# Patient Record
Sex: FEMALE | Race: WHITE | NOT HISPANIC OR LATINO | Employment: FULL TIME | URBAN - NONMETROPOLITAN AREA
[De-identification: names, ages, dates, MRNs, and addresses within clinical notes are randomized per-mention and may not be internally consistent; named-entity substitution may affect disease eponyms.]

---

## 2017-06-12 ENCOUNTER — TRANSCRIBE ORDERS (OUTPATIENT)
Dept: ADMINISTRATIVE | Facility: HOSPITAL | Age: 57
End: 2017-06-12

## 2017-06-12 DIAGNOSIS — Z13.820 SCREENING FOR OSTEOPOROSIS: Primary | ICD-10-CM

## 2017-06-12 DIAGNOSIS — Z12.31 VISIT FOR SCREENING MAMMOGRAM: ICD-10-CM

## 2017-08-01 ENCOUNTER — HOSPITAL ENCOUNTER (OUTPATIENT)
Dept: MAMMOGRAPHY | Facility: HOSPITAL | Age: 57
Discharge: HOME | End: 2017-08-01

## 2017-08-01 ENCOUNTER — HOSPITAL ENCOUNTER (OUTPATIENT)
Dept: BONE DENSITY | Facility: HOSPITAL | Age: 57
End: 2017-08-01

## 2017-08-16 ENCOUNTER — TRANSCRIBE ORDERS (OUTPATIENT)
Dept: ADMINISTRATIVE | Facility: HOSPITAL | Age: 57
End: 2017-08-16

## 2017-08-16 ENCOUNTER — HOSPITAL ENCOUNTER (OUTPATIENT)
Dept: MAMMOGRAPHY | Facility: HOSPITAL | Age: 57
Discharge: HOME | End: 2017-08-16
Admitting: NURSE PRACTITIONER

## 2017-08-16 ENCOUNTER — HOSPITAL ENCOUNTER (OUTPATIENT)
Dept: BONE DENSITY | Facility: HOSPITAL | Age: 57
Discharge: HOME | End: 2017-08-16

## 2017-08-16 DIAGNOSIS — Z13.820 SCREENING FOR OSTEOPOROSIS: ICD-10-CM

## 2017-08-16 DIAGNOSIS — R92.8 ABNORMAL MAMMOGRAM: Primary | ICD-10-CM

## 2017-08-16 DIAGNOSIS — Z12.31 VISIT FOR SCREENING MAMMOGRAM: ICD-10-CM

## 2017-08-16 PROCEDURE — 77080 DXA BONE DENSITY AXIAL: CPT

## 2017-08-16 PROCEDURE — 77067 SCR MAMMO BI INCL CAD: CPT | Performed by: RADIOLOGY

## 2017-08-16 PROCEDURE — 77063 BREAST TOMOSYNTHESIS BI: CPT

## 2017-08-16 PROCEDURE — 77063 BREAST TOMOSYNTHESIS BI: CPT | Performed by: RADIOLOGY

## 2017-08-16 PROCEDURE — G0202 SCR MAMMO BI INCL CAD: HCPCS

## 2017-08-16 PROCEDURE — 77080 DXA BONE DENSITY AXIAL: CPT | Performed by: RADIOLOGY

## 2017-08-23 ENCOUNTER — HOSPITAL ENCOUNTER (OUTPATIENT)
Dept: ULTRASOUND IMAGING | Facility: HOSPITAL | Age: 57
Discharge: HOME | End: 2017-08-23
Admitting: NURSE PRACTITIONER

## 2017-08-23 DIAGNOSIS — R92.8 ABNORMAL MAMMOGRAM: ICD-10-CM

## 2017-08-23 PROCEDURE — 76642 ULTRASOUND BREAST LIMITED: CPT | Performed by: RADIOLOGY

## 2017-08-23 PROCEDURE — 76642 ULTRASOUND BREAST LIMITED: CPT

## 2017-12-11 ENCOUNTER — TRANSCRIBE ORDERS (OUTPATIENT)
Dept: ADMINISTRATIVE | Facility: HOSPITAL | Age: 57
End: 2017-12-11

## 2017-12-11 DIAGNOSIS — R92.8 ABNORMAL MAMMOGRAM: Primary | ICD-10-CM

## 2018-01-04 ENCOUNTER — HOSPITAL ENCOUNTER (OUTPATIENT)
Dept: ULTRASOUND IMAGING | Facility: HOSPITAL | Age: 58
Discharge: HOME | End: 2018-01-04
Admitting: NURSE PRACTITIONER

## 2018-01-04 DIAGNOSIS — R92.8 ABNORMAL MAMMOGRAM: ICD-10-CM

## 2018-01-04 PROCEDURE — 76642 ULTRASOUND BREAST LIMITED: CPT | Performed by: RADIOLOGY

## 2018-01-04 PROCEDURE — 76642 ULTRASOUND BREAST LIMITED: CPT

## 2019-01-22 ENCOUNTER — SPECIALTY PHARMACY (OUTPATIENT)
Dept: PHARMACY | Facility: HOSPITAL | Age: 59
End: 2019-01-22

## 2019-01-22 RX ORDER — NALOXONE HYDROCHLORIDE 4 MG/.1ML
1 SPRAY NASAL AS NEEDED
Qty: 2 EACH | Refills: 0 | Status: SHIPPED | OUTPATIENT
Start: 2019-01-22

## 2019-01-22 NOTE — PROGRESS NOTES
The patient presented to our facility today requesting naloxone.  The patient received verbal and written education on the following:   - Risk factors of opioid overdose  - Strategies to prevent opioid overdose  - Signs of opioid overdose  - Steps in responding to an overdose   - Information about naloxone  - Procedures for administering naloxone  - Proper storage and expiration of the naloxone product dispensed      Pursuant to the [place] [employer] administrative regulation 201 JOSIAS 2:360, we will dispense:      Narcan® (naloxone HCl) 4mg/0.1mL nasal spray   Dispense #1 box (2 doses)    Sig: Call 911   Do not prime.  Spray into nostril upon signs of opioid overdose.     May repeat in 2-3 minutes in the opposite nostril if no or minimal breathing and  responsiveness, then as needed (if doses are available) every 2-3 minutes.      The signed protocol is kept in the [facility] [address]     The patient was given the opportunity to ask questions.  All questions were addressed.  The patient expressed understanding of the education provided.      Abigail Cruz [facility]  01/22/19  3:58 PM

## 2019-12-13 ENCOUNTER — HOSPITAL ENCOUNTER (OUTPATIENT)
Dept: MAMMOGRAPHY | Facility: HOSPITAL | Age: 59
Discharge: HOME | End: 2019-12-13

## 2019-12-13 ENCOUNTER — HOSPITAL ENCOUNTER (OUTPATIENT)
Dept: BONE DENSITY | Facility: HOSPITAL | Age: 59
Discharge: HOME | End: 2019-12-13
Admitting: NURSE PRACTITIONER

## 2019-12-13 DIAGNOSIS — M81.0 AGE-RELATED OSTEOPOROSIS WITHOUT CURRENT PATHOLOGICAL FRACTURE: ICD-10-CM

## 2019-12-13 DIAGNOSIS — Z12.31 VISIT FOR SCREENING MAMMOGRAM: ICD-10-CM

## 2019-12-13 PROCEDURE — 77067 SCR MAMMO BI INCL CAD: CPT

## 2019-12-13 PROCEDURE — 77063 BREAST TOMOSYNTHESIS BI: CPT | Performed by: RADIOLOGY

## 2019-12-13 PROCEDURE — 77067 SCR MAMMO BI INCL CAD: CPT | Performed by: RADIOLOGY

## 2019-12-13 PROCEDURE — 77063 BREAST TOMOSYNTHESIS BI: CPT

## 2021-02-25 DIAGNOSIS — Z23 IMMUNIZATION DUE: ICD-10-CM

## 2021-12-27 ENCOUNTER — APPOINTMENT (OUTPATIENT)
Dept: MAMMOGRAPHY | Facility: HOSPITAL | Age: 61
End: 2021-12-27

## 2022-03-21 ENCOUNTER — HOSPITAL ENCOUNTER (OUTPATIENT)
Dept: MAMMOGRAPHY | Facility: HOSPITAL | Age: 62
Discharge: HOME | End: 2022-03-21
Admitting: NURSE PRACTITIONER

## 2022-03-21 DIAGNOSIS — Z12.31 VISIT FOR SCREENING MAMMOGRAM: ICD-10-CM

## 2022-03-21 PROCEDURE — 77063 BREAST TOMOSYNTHESIS BI: CPT

## 2022-03-21 PROCEDURE — 77067 SCR MAMMO BI INCL CAD: CPT | Performed by: RADIOLOGY

## 2022-03-21 PROCEDURE — 77063 BREAST TOMOSYNTHESIS BI: CPT | Performed by: RADIOLOGY

## 2022-03-21 PROCEDURE — 77067 SCR MAMMO BI INCL CAD: CPT

## 2022-04-12 ENCOUNTER — APPOINTMENT (OUTPATIENT)
Dept: ULTRASOUND IMAGING | Facility: HOSPITAL | Age: 62
End: 2022-04-12

## 2022-04-12 ENCOUNTER — APPOINTMENT (OUTPATIENT)
Dept: MAMMOGRAPHY | Facility: HOSPITAL | Age: 62
End: 2022-04-12

## 2022-05-05 ENCOUNTER — HOSPITAL ENCOUNTER (OUTPATIENT)
Dept: MAMMOGRAPHY | Facility: HOSPITAL | Age: 62
Discharge: HOME | End: 2022-05-05

## 2022-05-05 ENCOUNTER — HOSPITAL ENCOUNTER (OUTPATIENT)
Dept: ULTRASOUND IMAGING | Facility: HOSPITAL | Age: 62
Discharge: HOME | End: 2022-05-05

## 2022-05-05 DIAGNOSIS — R92.8 ABNORMAL MAMMOGRAM: ICD-10-CM

## 2022-05-05 PROCEDURE — G0279 TOMOSYNTHESIS, MAMMO: HCPCS

## 2022-05-05 PROCEDURE — 76642 ULTRASOUND BREAST LIMITED: CPT | Performed by: RADIOLOGY

## 2022-05-05 PROCEDURE — 77061 BREAST TOMOSYNTHESIS UNI: CPT | Performed by: RADIOLOGY

## 2022-05-05 PROCEDURE — 77065 DX MAMMO INCL CAD UNI: CPT

## 2022-05-05 PROCEDURE — 76642 ULTRASOUND BREAST LIMITED: CPT

## 2022-05-05 PROCEDURE — 77065 DX MAMMO INCL CAD UNI: CPT | Performed by: RADIOLOGY

## 2022-06-02 ENCOUNTER — HOSPITAL ENCOUNTER (OUTPATIENT)
Dept: MAMMOGRAPHY | Facility: HOSPITAL | Age: 62
Discharge: HOME | End: 2022-06-02

## 2022-06-02 DIAGNOSIS — R92.8 ABNORMAL MAMMOGRAM OF LEFT BREAST: ICD-10-CM

## 2022-06-02 PROCEDURE — A4648 IMPLANTABLE TISSUE MARKER: HCPCS

## 2022-06-02 PROCEDURE — 77065 DX MAMMO INCL CAD UNI: CPT | Performed by: RADIOLOGY

## 2022-06-02 PROCEDURE — 19081 BX BREAST 1ST LESION STRTCTC: CPT | Performed by: RADIOLOGY

## 2022-06-06 LAB — REF LAB TEST METHOD: NORMAL

## 2022-06-08 ENCOUNTER — TELEPHONE (OUTPATIENT)
Dept: MAMMOGRAPHY | Facility: HOSPITAL | Age: 62
End: 2022-06-08

## 2022-06-08 NOTE — TELEPHONE ENCOUNTER
Patient notified of breast biopsy results and recommendations. Encouraged patient to call with further needs. Patient will call back after discussing results with family to set up a surgical consultation, NN notified.

## 2022-06-24 ENCOUNTER — NURSE NAVIGATOR (OUTPATIENT)
Dept: ONCOLOGY | Facility: CLINIC | Age: 62
End: 2022-06-24

## 2022-06-24 ENCOUNTER — OFFICE VISIT (OUTPATIENT)
Dept: SURGERY | Facility: CLINIC | Age: 62
End: 2022-06-24

## 2022-06-24 VITALS
BODY MASS INDEX: 38.27 KG/M2 | SYSTOLIC BLOOD PRESSURE: 164 MMHG | WEIGHT: 216 LBS | HEIGHT: 63 IN | HEART RATE: 88 BPM | DIASTOLIC BLOOD PRESSURE: 88 MMHG

## 2022-06-24 DIAGNOSIS — Z17.0 MALIGNANT NEOPLASM OF UPPER-OUTER QUADRANT OF LEFT BREAST IN FEMALE, ESTROGEN RECEPTOR POSITIVE: Primary | ICD-10-CM

## 2022-06-24 DIAGNOSIS — C50.412 MALIGNANT NEOPLASM OF UPPER-OUTER QUADRANT OF LEFT BREAST IN FEMALE, ESTROGEN RECEPTOR POSITIVE: Primary | ICD-10-CM

## 2022-06-24 PROCEDURE — 93702 BIS XTRACELL FLUID ANALYSIS: CPT | Performed by: SURGERY

## 2022-06-24 PROCEDURE — 76642 ULTRASOUND BREAST LIMITED: CPT | Performed by: SURGERY

## 2022-06-24 PROCEDURE — 99204 OFFICE O/P NEW MOD 45 MIN: CPT | Performed by: SURGERY

## 2022-06-24 RX ORDER — ERGOCALCIFEROL (VITAMIN D2) 10 MCG
400 TABLET ORAL DAILY
COMMUNITY

## 2022-06-24 RX ORDER — ROSUVASTATIN CALCIUM 10 MG/1
TABLET, COATED ORAL
COMMUNITY
Start: 2022-04-19

## 2022-06-24 RX ORDER — PROPRANOLOL HYDROCHLORIDE 40 MG/1
TABLET ORAL
COMMUNITY
Start: 2022-04-19

## 2022-06-24 RX ORDER — PANTOPRAZOLE SODIUM 40 MG/1
TABLET, DELAYED RELEASE ORAL
COMMUNITY
Start: 2022-05-19

## 2022-06-24 NOTE — PROGRESS NOTES
Subjective   Terri Foster is a 62 y.o. female here today for pathology review referred by Dr. Tierney.  History of Present Illness   Ms. Camacho was seen in the office today for her new diagnosis of left breast cancer.  She underwent a screening mammogram and subsequent imaging evaluation and biopsy.  Patient denies palpable mass or nipple discharge.  There is no prior history of breast biopsy or cyst aspiration.  There is no family history of breast or ovarian cancer.  The patient is postmenopausal and is not on hormone replacement therapy.  Imaging/pathology:  3/21/2022-screening mammogram demonstrating left breast asymmetric density  5/5/2022-diagnostic left breast mammogram and left breast ultrasound demonstrating distortion and hypoechoic mass.  6/2/2022-left breast stereotactic biopsy and mammogram demonstrating marking clip to be at the biopsy site and pathology demonstrating grade 1 infiltrating ductal carcinoma, ER positive, OR positive, HER2 negative  No Known Allergies  Current Outpatient Medications   Medication Sig Dispense Refill   • metFORMIN (GLUCOPHAGE) 500 MG tablet      • naloxone (NARCAN) 4 MG/0.1ML nasal spray Use 1 spray into the nostril(s) as directed by provider As Needed (opioid overdose). Call 911. Don't prime. Repeat in 2-3 min if necessary. 2 each 0   • pantoprazole (PROTONIX) 40 MG EC tablet TAKE 1 TABLET BY MOUTH TWICE DAILY FOR STOMACH     • propranolol (INDERAL) 40 MG tablet      • rosuvastatin (CRESTOR) 10 MG tablet      • Vitamin D, Cholecalciferol, (CHOLECALCIFEROL) 10 MCG (400 UNIT) tablet Take 400 Units by mouth Daily.       No current facility-administered medications for this visit.     Past Medical History:   Diagnosis Date   • Diabetes mellitus (HCC)    • Infectious viral hepatitis    • Migraines      Past Surgical History:   Procedure Laterality Date   • GALLBLADDER SURGERY     • TONSILLECTOMY         Pertinent Review of Systems:  Respiratory: no shortness of  "breath  Cardiovascular: no chest pain  Other pertinent:      Objective   /88 (BP Location: Left arm)   Pulse 88   Ht 160 cm (63\")   Wt 98 kg (216 lb)   BMI 38.26 kg/m²   Physical Exam  Well-developed well-nourished female no acute distress   neck:  No supraclavicular adenopathy.  Trachea midline  Lungs:  Respiratory effort normal. Auscultation: Clear, without wheezes, rhonchi, rales  Heart:  Regular rate and rhythm, without murmur, gallop, rub.  No pedal edema  Breasts: On visual inspection the breasts are symmetrical.  Examination of the right breast demonstrates no discrete mass, skin change, or axillary adenopathy.  Examination of the left breast demonstrates no discrete mass, skin change, or axillary adenopathy.       Procedures   Baseline L dex was performed and was 0.4    Limited left breast Ultrasound    Indication: Evaluation for clip placement    Description: With use of the 12.5 MHz transducer the area of clinical concern was scanned in multiple planes.    Findings: The upper outer quadrant of the left breast was scanned in multiple planes.  Directly posterior and slightly medial to the skin incision site the biopsy cavity and marking clip is identified.    Impression: Biopsy clip identified left breast 2:00    Recommendation: Procedure amenable to ultrasound localization    Results/Data:  Imaging: Mammogram and ultrasound reports and images from 6/2/2022, 5/5/2022 and 3/21/2022 were reviewed.  I agree with the assessment  Notes:   Lab:   Other: Pathology report was reviewed.  I agree with the assessment    Assessment & Plan   Clinical stage I carcinoma of the left breast, ER positive, ND positive, HER2 negative.    Plan: Proceed with breast MRI.  If no other findings identifiable patient will be an excellent candidate for breast conservation         Discussion/Summary: Treatment options discussed with the patient and son at length including lumpectomy plus radiation versus mastectomy.  Role of " sentinel node biopsy was discussed.  The role of chemotherapy was discussed.  The issue of margin status and possible need for additional procedures was discussed.    Time spent:     Class 2 Severe Obesity (BMI >=35 and <=39.9). Obesity-related health conditions include the following: hypertension. Obesity is newly identified. BMI is is above average; BMI management plan is completed. We discussed portion control and increasing exercise.       No future appointments.      Please note that portions of this note were completed with a voice recognition program.

## 2022-06-25 PROBLEM — Z17.0 MALIGNANT NEOPLASM OF UPPER-OUTER QUADRANT OF LEFT BREAST IN FEMALE, ESTROGEN RECEPTOR POSITIVE: Status: ACTIVE | Noted: 2022-06-25

## 2022-06-25 PROBLEM — C50.412 MALIGNANT NEOPLASM OF UPPER-OUTER QUADRANT OF LEFT BREAST IN FEMALE, ESTROGEN RECEPTOR POSITIVE: Status: ACTIVE | Noted: 2022-06-25

## 2022-06-28 ENCOUNTER — NURSE NAVIGATOR (OUTPATIENT)
Dept: ONCOLOGY | Facility: CLINIC | Age: 62
End: 2022-06-28

## 2022-06-28 NOTE — PROGRESS NOTES
Met with patient and son on this date. Patient seen in a new consult with Dr. Rhoades. The role of the Nurse Navigator introduced to patient/son. Time spent talking to patient, empathetic listening provided and reassurance given. New patient folder given and reviewed with patient. NN contact information provided and encouraged patient to call with any questions or concerns. Patient verbalized understanding and is in agreement with plan of care. NN will follow and assist PRN.

## 2022-06-28 NOTE — PROGRESS NOTES
Called patient on this date to follow up after surgery consult. NN addressed illness, understanding, and provided clarification as needed.  Time spent talking with patient, empathetic listening provided, and reassurance given. NN contact information was provided and encouraged patient to call with any questions or concerns. Pt. Verbalized understanding and is in agreement with plan of care. NN will follow and assist PRN.

## 2022-06-29 ENCOUNTER — TELEPHONE (OUTPATIENT)
Dept: ONCOLOGY | Facility: CLINIC | Age: 62
End: 2022-06-29

## 2022-06-29 NOTE — TELEPHONE ENCOUNTER
Called patient gave her new appointment day and time. Patient agreed to new date and time, she RBV.

## 2022-07-06 ENCOUNTER — HOSPITAL ENCOUNTER (OUTPATIENT)
Dept: MRI IMAGING | Facility: HOSPITAL | Age: 62
Discharge: HOME | End: 2022-07-06
Admitting: SURGERY

## 2022-07-06 DIAGNOSIS — Z17.0 MALIGNANT NEOPLASM OF UPPER-OUTER QUADRANT OF LEFT BREAST IN FEMALE, ESTROGEN RECEPTOR POSITIVE: ICD-10-CM

## 2022-07-06 DIAGNOSIS — C50.412 MALIGNANT NEOPLASM OF UPPER-OUTER QUADRANT OF LEFT BREAST IN FEMALE, ESTROGEN RECEPTOR POSITIVE: ICD-10-CM

## 2022-07-06 LAB — CREAT BLDA-MCNC: 0.7 MG/DL (ref 0.6–1.3)

## 2022-07-06 PROCEDURE — 0 GADOBENATE DIMEGLUMINE 529 MG/ML SOLUTION: Performed by: SURGERY

## 2022-07-06 PROCEDURE — 77049 MRI BREAST C-+ W/CAD BI: CPT | Performed by: RADIOLOGY

## 2022-07-06 PROCEDURE — A9577 INJ MULTIHANCE: HCPCS | Performed by: SURGERY

## 2022-07-06 PROCEDURE — 77049 MRI BREAST C-+ W/CAD BI: CPT

## 2022-07-06 PROCEDURE — 82565 ASSAY OF CREATININE: CPT

## 2022-07-06 RX ADMIN — GADOBENATE DIMEGLUMINE 20 ML: 529 INJECTION, SOLUTION INTRAVENOUS at 01:45

## 2022-07-08 ENCOUNTER — TELEPHONE (OUTPATIENT)
Dept: SURGERY | Facility: CLINIC | Age: 62
End: 2022-07-08

## 2022-07-08 NOTE — TELEPHONE ENCOUNTER
Informed patient that MRI did not show anything new. Ask if she wanted to come in and talk Dr. Rhoades or go ahead and pick a surgery date. She said her son who lives in [place] has ask her to get a second opinion which she is going to do. I suggested that when he comes into town to come meet Dr. Rhoades and share his concerns. She said she'd very much like to do that and will call when he's here. I told her I would place her in my result book for the first week in August just to make sure she is getting treatment because that is the most important thing.

## 2022-07-13 ENCOUNTER — NURSE NAVIGATOR (OUTPATIENT)
Dept: ONCOLOGY | Facility: CLINIC | Age: 62
End: 2022-07-13

## 2022-07-13 NOTE — PROGRESS NOTES
Called patient to follow up if patient needed help setting up for a second opinion at her request. Also, to find out if she had an idea of when her son was coming into states from [place], to see if she wanted to set up a time and date to talk to Dr. Rhoades again. Patient didn't answer, but left a message and contact information.

## 2022-07-15 ENCOUNTER — HOSPITAL ENCOUNTER (OUTPATIENT)
Dept: MRI IMAGING | Facility: HOSPITAL | Age: 62
End: 2022-07-15

## 2022-07-18 ENCOUNTER — NURSE NAVIGATOR (OUTPATIENT)
Dept: ONCOLOGY | Facility: CLINIC | Age: 62
End: 2022-07-18

## 2022-07-18 NOTE — PROGRESS NOTES
Called patient on this date to talk about her plans for treatment. Patient is going for a second opinion at  on 7/22/22. Patient stated she would lets us know as soon as a possible where she wanted to have surgery and treatment at. NN gave contact information and encouraged her to call with any questions or concerns.

## 2022-08-02 ENCOUNTER — NURSE NAVIGATOR (OUTPATIENT)
Dept: ONCOLOGY | Facility: CLINIC | Age: 62
End: 2022-08-02

## 2022-08-02 NOTE — PROGRESS NOTES
Called patient on this date to follow up after her 2nd opinion at [facility] in [place]. Patient stated the surgeon in [place] reviewed the MRI taken at [facility] and seen some concerning areas and performed another biopsy on several spots, per patient. Patient is awaiting results now and will keep in touch with us in regards to where she will be having surgery/treatment. NN encouraged her to reach out with any questions or concerns.

## 2023-02-22 ENCOUNTER — APPOINTMENT (OUTPATIENT)
Dept: RADIATION ONCOLOGY | Facility: HOSPITAL | Age: 63
End: 2023-02-22
Payer: COMMERCIAL

## 2023-02-22 ENCOUNTER — CONSULT (OUTPATIENT)
Dept: RADIATION ONCOLOGY | Facility: HOSPITAL | Age: 63
End: 2023-02-22
Payer: COMMERCIAL

## 2023-02-22 VITALS
DIASTOLIC BLOOD PRESSURE: 75 MMHG | SYSTOLIC BLOOD PRESSURE: 135 MMHG | HEART RATE: 81 BPM | RESPIRATION RATE: 18 BRPM | TEMPERATURE: 96.6 F | OXYGEN SATURATION: 97 % | BODY MASS INDEX: 34.9 KG/M2 | WEIGHT: 197 LBS

## 2023-02-22 DIAGNOSIS — Z17.0 MALIGNANT NEOPLASM OF UPPER-OUTER QUADRANT OF LEFT BREAST IN FEMALE, ESTROGEN RECEPTOR POSITIVE: Primary | ICD-10-CM

## 2023-02-22 DIAGNOSIS — C50.412 MALIGNANT NEOPLASM OF UPPER-OUTER QUADRANT OF LEFT BREAST IN FEMALE, ESTROGEN RECEPTOR POSITIVE: Primary | ICD-10-CM

## 2023-02-22 PROCEDURE — G0463 HOSPITAL OUTPT CLINIC VISIT: HCPCS | Performed by: RADIOLOGY

## 2023-02-22 PROCEDURE — 99205 OFFICE O/P NEW HI 60 MIN: CPT | Performed by: RADIOLOGY

## 2023-02-22 RX ORDER — FLUTICASONE PROPIONATE 50 MCG
SPRAY, SUSPENSION (ML) NASAL
COMMUNITY
Start: 2022-11-17

## 2023-02-22 RX ORDER — DIPHENOXYLATE HYDROCHLORIDE AND ATROPINE SULFATE 2.5; .025 MG/1; MG/1
TABLET ORAL DAILY
COMMUNITY

## 2023-02-22 RX ORDER — OFLOXACIN 3 MG/ML
SOLUTION AURICULAR (OTIC)
COMMUNITY
Start: 2023-02-06

## 2023-02-22 RX ORDER — ANASTROZOLE 1 MG/1
1 TABLET ORAL DAILY
COMMUNITY
Start: 2023-02-17 | End: 2024-02-17

## 2023-02-22 RX ORDER — VITAMIN E 268 MG
CAPSULE ORAL DAILY
COMMUNITY

## 2023-02-22 RX ORDER — ONDANSETRON HYDROCHLORIDE 8 MG/1
TABLET, FILM COATED ORAL
COMMUNITY
Start: 2022-11-18

## 2023-02-22 RX ORDER — AMOXICILLIN 875 MG/1
TABLET, COATED ORAL
COMMUNITY
Start: 2023-02-06

## 2023-02-22 NOTE — PROGRESS NOTES
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  Review post-surgical  radiotherapy treatment options.    ICD-10: C50.912                                              History of Present Illness:   Mrs. Terri Foster is a 63-year-old white female who was noted to have a focal asymmetry in the upper outer quadrant of the left breast on screening mammograms obtained on 03/21/2022.  Diagnostic left mammogram and ultrasound (05/05/2022) confirmed the presence of a 0.8 cm irregular hypoechoic nodule at the 2 o'clock position of the left breast.    The patient denied self detection of a breast mass; breast pain; nipple discharge, bleeding or inversion; breast skin color changes; and recent breast trauma.  The patient states that she underwent a negative right breast biopsy several years ago.    MRI scans of the breasts (07/06/2022) showed faint enhancement at the 2 o'clock position of the left breast.  Ultrasound guided biopsy of the left subareolar tissues (07/26/2022) revealed fibroadenoma.    Mrs. Foster underwent a stereotactic left breast biopsy on 12/16/2022.  The biopsy revealed well differentiated ductal carcinoma, [place] grade 1/3.  The specimen was positive for estrogen and progesterone receptors.  HER2 was not overexpressed.    The patient was enrolled in the CHRISTAL-54 trial which is examining the efficacy of neoadjuvant endocrine therapy in ER positive, HER2 negative early stage breast cancer.  The patient received anastrozole.    Mrs. Foster underwent a left lumpectomy procedure with sentinel lymph node biopsies at the [facility] on 01/19/2023.  Examination of resected tissue revealed a 2.5 cm ductal carcinoma and low-grade ductal carcinoma in situ.  The invasive  tumor approached to within 2 mm of a margin of resection.  The DCIS margin was 1 mm.  Micrometastases measuring 1.58 mm was noted in 1 of 2 sentinel lymph nodes.  There was no evidence of extracapsular tumor extension.    The patient's case was discussed at a multidisciplinary tumor board.  Postoperative breast radiotherapy was recommended.  The patient has opted to receive said therapy at [place] due to our closer location to the patient's home at [place].     The patient is undergoing Oncotype DX testing.  It was requested that radiotherapy not commence until the Oncotype DX results are available.    This is a new problem to me, and one that is potentially life-threatening.  (Old medical records were requested, reviewed, and summarized in the HPI)    Obstetric-Gynecologic History:  Menarche at age 14 years, menopause at age 50 years.  A1 (second trimester miscarriage) C0.  First pregnancy at age 24 years.  Patient used birth control pills for a short period in the remote past.  Contraception was barrier type (condom).  Patient denies use of adjuvant hormonal therapy.    Review of Systems   Constitutional: Noncontributory.  Patient states that her appetite and energy levels are good.  She denies symptomatology from her recent left breast surgery.  HEENT: Patient has diminished vision bilaterally, uses glasses.  Patient reports right-sided otalgia.  She is on antibiotic therapy.  Cardiovascular: Noncontributory  Pulmonary: Noncontributory  Gastrointestinal: Patient refers GERD symptoms  Genitourinary: Noncontributory  Hematopoietic/lymphatic: Noncontributory  Integumentary: Noncontributory  Musculoskeletal: Patient reports occasional discomfort in minor joints of the left hand  Neurologic: Noncontributory  Psychiatric: Noncontributory      Vitals:    23 1002   BP: 135/75   Pulse: 81   Resp: 18   Temp: 96.6 °F (35.9 °C)   SpO2: 97%     Past Medical History:   Diagnosis Date   • Diabetes mellitus (HCC)     • Infectious viral hepatitis    • Migraines         Past Surgical History:   Procedure Laterality Date   • BREAST LUMPECTOMY Right 01/2023    @ [facility]   • GALLBLADDER SURGERY     • TONSILLECTOMY        Family History   Problem Relation Age of Onset   • Diabetes Mother    • Hyperlipidemia Mother    • Diabetes Father    • Heart disease Father    • Hyperlipidemia Father    • Cancer Brother         skin   • Diabetes Brother    • Hyperlipidemia Brother    • Breast cancer Neg Hx         Social History     Socioeconomic History   • Marital status: Legally    Tobacco Use   • Smoking status: Never   • Smokeless tobacco: Never   Vaping Use   • Vaping Use: Never used   Substance and Sexual Activity   • Alcohol use: Never   • Drug use: Never   • Sexual activity: Defer      The patient is a nondrinker.  She denies use of all tobacco products.    Allergies:  Patient has no known allergies.   Prior to Admission medications    Medication Sig Start Date End Date Taking? Authorizing Provider   amoxicillin (AMOXIL) 875 MG tablet  2/6/23  Yes Cathy House MD   anastrozole (ARIMIDEX) 1 MG tablet Take 1 mg by mouth Daily. 2/17/23 2/17/24 Yes Cathy House MD   fluticasone (FLONASE) 50 MCG/ACT nasal spray SHAKE LIQUID AND USE 1 SPRAY IN EACH NOSTRIL EVERY 12 HOURS FOR NASAL CONGESTION 11/17/22  Yes Cathy House MD   metFORMIN (GLUCOPHAGE) 500 MG tablet 500 mg 2 (Two) Times a Day With Meals. 5/19/22  Yes Cathy House MD   multivitamin (THERAGRAN) tablet tablet Take  by mouth Daily.   Yes Cathy House MD   naloxone (NARCAN) 4 MG/0.1ML nasal spray Use 1 spray into the nostril(s) as directed by provider As Needed (opioid overdose). Call 911. Don't prime. Repeat in 2-3 min if necessary. 1/22/19  Yes Shanelle Marie MD   ofloxacin (FLOXIN) 0.3 % otic solution INSTILL 10 DROPS TO AFFECTED EAR EVERY DAY FOR 5 DAYS 2/6/23  Yes Cathy House MD   ondansetron (ZOFRAN) 8 MG tablet   22  Yes Cathy House MD   pantoprazole (PROTONIX) 40 MG EC tablet TAKE 1 TABLET BY MOUTH TWICE DAILY FOR STOMACH 22  Yes Cathy House MD   propranolol (INDERAL) 40 MG tablet  22  Yes Cathy House MD   rosuvastatin (CRESTOR) 10 MG tablet  22  Yes Cathy House MD   Vitamin D, Cholecalciferol, (CHOLECALCIFEROL) 10 MCG (400 UNIT) tablet Take 400 Units by mouth Daily.   Yes Cathy House MD   vitamin E 400 UNIT capsule Take  by mouth Daily.   Yes Cathy House MD      Pain:(on a scale of 0-10)    Pain Score    23 [facility][note 3]   PainSc: 0-No pain        Terri Foster reports a pain score of 0.  Given her pain assessment as noted, treatment options were discussed and the following options were decided upon as a follow-up plan to address the patient's pain: patient declined analgesics.       Quality of Life:   KPS: 100  ECO    Advanced Care Plan: Not discussed during this visit   Advance Care Planning   ACP discussion was declined by the patient. Patient does not have an advance directive, declines further assistance.     PHQ-9 Depression Screening:   Little interest or pleasure in doing things?     Feeling down, depressed, or hopeless?     Trouble falling or staying asleep, or sleeping too much?     Feeling tired or having little energy?     Poor appetite or overeating?     Feeling bad about yourself - or that you are a failure or have let yourself or your family down?     Trouble concentrating on things, such as reading the newspaper or watching television?     Moving or speaking so slowly that other people could have noticed? Or the opposite - being so fidgety or restless that you have been moving around a lot more than usual?     Thoughts that you would be better off dead, or of hurting yourself in some way?     PHQ-9 Total Score     If you checked off any problems, how difficult have these problems made it for you to do your work, take care of  things at home, or get along with other people?      PHQ-9 Total Score: 0       Patient screened negative for depression based on a PHQ-9 score of 0 on 2/22/2023.  No further action is indicated.     Physical Examination:  Vitals: See above  Weight: Weight: 89.4 kg (197 lb)   Body mass index is 34.9 kg/m².    Physical Exam  Constitutional: The patient is a well-developed, well-nourished middle-aged white female in no acute distress.  Alert and oriented ×3.  HEENT: Atraumatic. Normocephalic. No abnormalities noted.  Natural upper and lower dentition.  No intraoral lesions present.  Neck: Short, supple, trachea at midline.  No thyromegaly.  No detectable submental, submandibular, cervical or periclavicular adenopathy.  No JVD or carotid bruits.  Lymphatics: No cervical, supraclavicular, or axillary, or inguinal lymphadenopathy is palpated.  CV: Regular rate and rhythm.  No murmurs, rubs, or gallops are appreciated.  Respiratory: Lungs clear to auscultation.  Breath sounds equal bilaterally.  Breasts: The breasts appear symmetric.  No masses are detected.  Patient has a well-healed surgical scar noted at the lateral aspect of the left breast.  No seroma palpated  GI: Abdomen soft, nontender, nondistended, with no hepatosplenomegaly or masses palpated.  Surgical scars from laparoscopic cholecystectomy noted.  Rectal: Not performed  Pelvic: Deferred  Neurologic: Cranial nerves II through XII are grossly intact, with no focal neurological deficits noted on exam.  Normal gait  Psychiatric: Alert and oriented x3. Normal affect, with no anxiety or depression noted.  Normal mood and affect    Radiographs : NM Tumor Localization Whole Body Planar Single Day    Result Date: 1/19/2023  Preoperative intradermal injections for sentinel lymph node surgery. CRITICAL RESULT: No. COMMUNICATION: Per this written report. Approved by Dereje Yung DO on 1/19/2023 9:53 AM By electronically signing this report, I, the attending physician,  attest that I have personally reviewed the images/data for the above examination(s) and agree with the final edited report. Dictated by Dereje Yung DO on 1/19/2023 9:53 AM Signed by Wayne Haque MD on 1/19/2023 10:11 AM    CT Abdomen Pelvis With Contrast    Result Date: 1/9/2023  No specific imaging findings for abdominal or pelvic metastasis. Hepatic steatosis. Mildly enlarged periportal lymph nodes are nonspecific but may reflect sequelae of hepatocellular parenchymal disease. CRITICAL RESULT:   No. COMMUNICATION: Per this written report. Dictated by Brigido Guzman MD on 1/9/2023 8:04 AM Signed by Brigido Guzman MD on 1/9/2023 9:00 AM    DEXA Bone Density Axial    Result Date: 11/18/2022  Normal bone mass. RECOMMENDATIONS: Timing of follow-up DXA should be determined based on clinical assessment, and may be appropriate in two years to evaluate change in BMD. A follow-up DXA should be performed on the same DXA scanner to allow for direct comparison and calculation of change in BMD. CRITICAL RESULT: No. COMMUNICATION: Per this written report. Approved by Dereje Yung DO on 11/18/2022 8:48 AM By electronically signing this report, I, the attending physician, attest that I have personally reviewed the images/data for the above examination(s) and agree with the final edited report. Dictated by Dereje Yung DO on 11/18/2022 8:48 AM Signed by Jeffery Carlos MD on 11/18/2022 9:59 AM    Mammography Breast Surgical Specimen    Result Date: 1/19/2023  Technically successful mammographically guided localization of the two abnormalities in the upper out quadrant of the left breast. CRITICAL RESULT:   No. COMMUNICATION: Findings communicated via telephone to Dr. Nayak on 1/19/2023 at the time of the specimen review. Dictated by Arin Begum MD on 1/19/2023 11:15 AM Signed by Arin Begum MD on 1/19/2023 11:26 AM    Mammography Guided Localization Breast Left    Result Date: 1/19/2023  Technically successful  mammographically guided localization of the two abnormalities in the upper out quadrant of the left breast. CRITICAL RESULT:   No. COMMUNICATION: Findings communicated via telephone to Dr. Nayak on 1/19/2023 at the time of the specimen review. Dictated by Arin Begum MD on 1/19/2023 11:15 AM Signed by Arin Begum MD on 1/19/2023 11:26 AM    Mammography Stereotactic Breast Biopsy Left    Result Date: 12/13/2022  Successful stereotactic biopsy of the calcifications in the left breast at 3:00,  10 cm from the nipple. Post-procedure mammogram of the left breast shows the Q shaped tissue marker is in the appropriate location. Small post-procedural hematoma is present. Histologic findings will be correlated. An addendum will be issued when pathology results become available. PATHOLOGY ASSESSMENT: Waiting for pathology. PATHOLOGY RECOMMENDATION: Waiting for pathology. CRITICAL RESULT:   No. COMMUNICATION: Per this written report. By electronically signing this report, I, the attending physician, attest that I was present for the entire procedure(s) and agree with the final edited report. Dictated by Kaylene Crabtree DO on 12/13/2022 11:38 AM Signed by Ron Ann MD on 12/13/2022 1:38 PM    Mammography Breast Post Biopsy Clip Left    Result Date: 12/13/2022  Successful stereotactic biopsy of the calcifications in the left breast at 3:00,  10 cm from the nipple. Post-procedure mammogram of the left breast shows the Q shaped tissue marker is in the appropriate location. Small post-procedural hematoma is present. Histologic findings will be correlated. An addendum will be issued when pathology results become available. PATHOLOGY ASSESSMENT: Waiting for pathology. PATHOLOGY RECOMMENDATION: Waiting for pathology. CRITICAL RESULT:   No. COMMUNICATION: Per this written report. By electronically signing this report, I, the attending physician, attest that I was present for the entire procedure(s) and agree with the final edited  report. Dictated by Kaylene Crabtree DO on 12/13/2022 11:38 AM Signed by Ron Ann MD on 12/13/2022 1:38 PM    Mammography Breast Diagnostic Tomosynthesis Bilateral    Result Date: 11/18/2022  Right Breast BI-RADS Code:  BI-RADS 1, Negative. Left Breast BI-RADS Code: Finding 1: BI-RADS 2, benign finding Finding 2: BI-RADS 6, biopsy proven malignancy Finding 3: BI-RADS 4, Suspicious finding. Finding 4: BI-RADS 3, probably benign RECOMMENDATIONS: Left Breast Recommendations: Finding 2: Medical/Surgical follow up Finding 3: Stereotactic - Guided Breast Biopsy Finding 4: Diagnostic Mammogram in 6 Months  Breast Ultrasound 6 Months CRITICAL RESULT: No. COMMUNICATION: The results and recommendations were discussed with the patient and a printed lay language version of the imaging report was given to the patient at the time of the visit. The mammogram was read with the assistance of CAD and tomosynthesis. By electronically signing this report, I, the attending physician, attest that I have personally reviewed the images/data for the above examination(s) and agree with the final edited report. Dictated by Gurinder Reeder DO on 11/18/2022 10:06 AM Signed by Arin Begum MD on 11/18/2022 11:47 AM    US Breast Left Limited    Result Date: 11/18/2022  Right Breast BI-RADS Code:  BI-RADS 1, Negative. Left Breast BI-RADS Code: Finding 1: BI-RADS 2, benign finding Finding 2: BI-RADS 6, biopsy proven malignancy Finding 3: BI-RADS 4, Suspicious finding. Finding 4: BI-RADS 3, probably benign RECOMMENDATIONS: Left Breast Recommendations: Finding 2: Medical/Surgical follow up Finding 3: Stereotactic - Guided Breast Biopsy Finding 4: Diagnostic Mammogram in 6 Months  Breast Ultrasound 6 Months CRITICAL RESULT: No. COMMUNICATION: The results and recommendations were discussed with the patient and a printed lay language version of the imaging report was given to the patient at the time of the visit. The mammogram was read with the  assistance of CAD and tomosynthesis. By electronically signing this report, I, the attending physician, attest that I have personally reviewed the images/data for the above examination(s) and agree with the final edited report. Dictated by Gurinder Reeder DO on 11/18/2022 10:06 AM Signed by Arin Begum MD on 11/18/2022 11:47 AM    Pathology:   Lab Results   Component Value Date    CASEREPORT  01/19/2023     Surgical Pathology                                Case: [id number]                                   Authorizing Provider:  Luz Nayak MD           Collected:           01/19/2023 1116              Ordering Location:     [facility]  Received:            01/19/2023 1131                                     Surgery                                                                      Pathologist:           Valery Javed MD                                                          Specimens:   A) - Breast, Left, left breast bracketed needle localized lumpectomy- shiort                        superior, long lateral- ink blue superficial, black deep, orande medial, yellow                     lateral                                                                                             B) - Breast, Left, left breast new  lateral margin- clip marks new margin                           C) - Breast, Left, left breast new superior anterior margin- clip marks new margin                  D) - Breast, Left, left breast new Inferior Anterior margin- clip marks new margin                  E) - Breast, Left, left axillary tail sentinel node #1- Count 67,050- Blue                          F) - Breast, Left, Left axillary sentinel node- count 13,967- blue                       CASEREPORT  12/13/2022     Surgical Pathology                                Case: [id number]                                   Authorizing Provider:  Luz Nayak MD           Collected:           12/13/2022 1130               Ordering Location:     [facility]  Received:            12/13/2022 1308              Pathologist:           Ernestina Nolan MD                                                           Specimen:    Breast, Left, left breast 9mm calcifications 3:00 10cmfn                                 CASEREPORT  07/26/2022     Surgical Pathology                                Case: [id number]                                   Authorizing Provider:  Luz Nayak MD           Collected:           07/26/2022 1354              Ordering Location:     [facility]  Received:            07/27/2022 0800              Pathologist:           Ernestina Nolan MD                                                           Specimen:    Breast, Left, US core bx left breast 9:00 subareolar 9mm intraductal mass                 Labs:   Lab Results   Component Value Date    CREATININE 0.70 07/06/2022    EGFRIFNONA  01/06/2023      Comment:      Unable to calculate, at least one value is above or below the detection limit.       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  The tumor stage specific treatment recommendations of the National Comprehensive Cancer Network, version 2.2023, were consulted.  Mrs. Foster is a candidate for a course of postoperative left breast radiotherapy.  The  goals of treatment are to eradicate any residual malignancy within the breast and lower axillary lymph nodes, and to reduce the risk of local tumor recurrence.    The patient's case was recently discussed at a multidisciplinary tumor board at the [place] [place].  Breast radiotherapy including the axillary level 1 and 2 lymph nodes was recommended.    I explained to Mrs. Foster that postoperative breast radiotherapy is standard treatment after breast conservation surgery.  I reviewed the options of conventional and hypofractionated breast radiotherapy.  Conventional radiotherapy would consist in the administration of approximately 5000 cGy to the breast and mid/lower axilla over a 5-week.  The patient would then receive boost radiotherapy to the tumor bed given very close surgical margins for both ductal carcinoma and ductal carcinoma in situ.  Hypofractionated radiotherapy would treat the breast to a dose of 4256 cGy in 16 treatment fractions over a 3-week period with a tumor bed boost to follow.  I explained to the patient that clinical trials have shown similar outcomes, breast cosmesis, and side effect profile for both conventional and hypofractionated radiotherapy.    I reviewed the rationale for postoperative radiotherapy, durations of treatment, expected outcomes, possible acute and chronic side effects, and posttreatment surveillance measures with Mrs. Foster.  The patient's many treatment related questions regarding breast radiotherapy were answered to her satisfaction.    Radiotherapy will not commence until the results of the recent Oncotype DX test are available, and the patient has been cleared for radiotherapy by the medical oncologist.  I explained to the patient that breast/carolyn radiotherapy usually commences after completion of chemotherapy.  The patient informs me that she was instructed to continue anastrozole therapy.    Time spent with patient 60 minutes (the total time includes  pre-visit review of medical records and imaging studies, patient interview, appropriate medical examination/evaluation, and patient counseling).    Thank you for allowing us to evaluate Mrs. Foster.    Hiren Orosco MD  02/22/2023  12:38      Electronically signed by Hiren Orosco MD 2/22/2023 12:02 EST

## 2023-02-27 ENCOUNTER — DOCUMENTATION (OUTPATIENT)
Dept: ONCOLOGY | Facility: HOSPITAL | Age: 63
End: 2023-02-27
Payer: COMMERCIAL

## 2023-02-27 NOTE — PROGRESS NOTES
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     Patient in clinic Wednesday, February 22, 2023 for radiation consultation with Dr. Orosco.    Patient completed NCCN Distress Screening and scored a 1 out of 10.    SS received consult per IRISH Daniels for psychosocial assessment.    Patient is followed by oncology at [facility] for hormone therapy.     Referral  Received: 5 days ago  Frances Boston MA Taylor, Pamela F, MSW      Ambulatory Referral to ONC Social Work [[id number]]    Electronically signed by: Frances Boston MA on 02/22/23 1151 Status: Active   Ordering user: Frances Boston MA 02/22/23 1151 Ordering provider: Hiren Orosco MD   Authorized by: Hiren Orosco MD   Cosigning events   Electronically cosigned by Hiren Orosco MD 02/24/23 1151 for Ordering   Frequency:  02/22/23 -     Diagnoses   Malignant neoplasm of upper-outer quadrant of left breast in female, estrogen receptor positive (HCC) [C50.412, Z17.0]   Questionnaire    Question Answer   Follow-up needed: Yes       SS contacted patient [phone] this date.  Role of oncology social worker introduced to patient.    Patient lives at home alone. Role of oncology social worker introduced to patient.    Patient doesn't utilize any home health.    Patient doesn't utilize any durable medical equipment.    Patient independent with transportation.    Patient has three children: Praveen Randolph, Emilie Ruiz, and Maico Mendes Tomasa.    Patient works full time at [employer].    Patient independent with transportation.    Advance Care Planning:  The patient and I discussed care planning \"Conversations that Matter.\" This service was offered, free of charge, for development of advance directives with a certified ACP facilitator. The patient does not have an up-to-date advance directive. The patient is not " interested in an appointment with one of our facilitators to create or update their advanced directives.    Patient completed NCCN Distress Screening and scored herself a 1 out of 10.    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    Location of Distress Screening: Radiation Oncology    Distress Level: 1 (2/22/2023 10:00 AM)        Interventions: Time spent talking with patient, empathetic listening provided, and reassurance given.  SS provided information on counseling services.  Patient declines at this time.    Financial Needs: non-medical grants (Patient currently working at [email or web address].  Patient plans to continue working as much as possible.)  Transportation Needs: gas cards (Patient lives in [facility] at [address] [place] [address] traveling approximately 26 miles round trip.    SS completed application for [employer] for travel assistance.)  Emotional Needs: emotional suppport/coping strategies  Confucianist Needs: other (comments) (Confucianism not discussed at this time.)  Physical Needs: other (see comments) (Patient doesn't mention any physical needs at this time.  Patient states that son and mother to assist as needed.)  Palliative Care: advance care planning (The patient does not have an up-to-date advance directive. The patient is not interested in an appointment with one of our facilitators to create or update their advanced directives.)      Patient lives in [facility] at [address] [place] [address] traveling approximately 26 miles round trip.    SS completed application for [employer] for travel assistance.      SS contact information provided and encouraged patient to call with any questions or concerns.    SS will follow as needed.

## 2023-02-28 ENCOUNTER — DOCUMENTATION (OUTPATIENT)
Dept: ONCOLOGY | Facility: HOSPITAL | Age: 63
End: 2023-02-28
Payer: COMMERCIAL

## 2023-02-28 NOTE — PROGRESS NOTES
SS completed application for [facility] and faxed [phone] to agency.    SS copied application with confirmation fax and copied into patient's chart.

## 2023-03-17 ENCOUNTER — DOCUMENTATION (OUTPATIENT)
Dept: ONCOLOGY | Facility: HOSPITAL | Age: 63
End: 2023-03-17
Payer: COMMERCIAL

## 2023-03-17 NOTE — PROGRESS NOTES
SS received e-mail per Mandy Foster, with [employer] program, who states that she mailed patient travel assistance gas card in the amount of $75 on 03/16/2023 per [employer] program.    SS will follow as needed

## 2023-04-04 ENCOUNTER — OFFICE VISIT (OUTPATIENT)
Dept: RADIATION ONCOLOGY | Facility: HOSPITAL | Age: 63
End: 2023-04-04
Payer: COMMERCIAL

## 2023-04-04 ENCOUNTER — APPOINTMENT (OUTPATIENT)
Dept: RADIATION ONCOLOGY | Facility: HOSPITAL | Age: 63
End: 2023-04-04
Payer: COMMERCIAL

## 2023-04-04 VITALS
HEART RATE: 68 BPM | RESPIRATION RATE: 18 BRPM | DIASTOLIC BLOOD PRESSURE: 76 MMHG | TEMPERATURE: 96.8 F | SYSTOLIC BLOOD PRESSURE: 130 MMHG | OXYGEN SATURATION: 96 %

## 2023-04-04 DIAGNOSIS — C50.412 MALIGNANT NEOPLASM OF UPPER-OUTER QUADRANT OF LEFT BREAST IN FEMALE, ESTROGEN RECEPTOR POSITIVE: ICD-10-CM

## 2023-04-04 DIAGNOSIS — Z17.0 MALIGNANT NEOPLASM OF UPPER-OUTER QUADRANT OF LEFT BREAST IN FEMALE, ESTROGEN RECEPTOR POSITIVE: ICD-10-CM

## 2023-04-04 PROCEDURE — 77290 THER RAD SIMULAJ FIELD CPLX: CPT | Performed by: RADIOLOGY

## 2023-04-04 PROCEDURE — 77263 THER RADIOLOGY TX PLNG CPLX: CPT | Performed by: RADIOLOGY

## 2023-04-04 PROCEDURE — 77332 RADIATION TREATMENT AID(S): CPT | Performed by: RADIOLOGY

## 2023-04-04 PROCEDURE — G0463 HOSPITAL OUTPT CLINIC VISIT: HCPCS | Performed by: RADIOLOGY

## 2023-04-06 NOTE — PROGRESS NOTES
I had a phone discussion with Ms. Foster last evening contents of which I wish to record for the medical record.  We had discussed at the time of her simulation being sensitive to the number of fractions she is given as she has various training requirements for her job which limit her schedule flexibility.  I explained to her however that given that she had involvement of one of her sentinel lymph nodes extracted from her axilla in my judgment irradiation should be directed to the left breast, supraclavicular fossa and levels 1-2 of the axilla.  I explained that there is no anatomic barrier between the axillary lymph nodes and the lymph nodes higher up the chain extending into the supraclavicular fossa for would be my preference to include these as well.  We discussed treating the internal mammary node chain but in my view since this was a small single metastatic deposit and a small primary and this is a left-sided lesion the risks reward ratio for treating the internal mammary node chain would not be to her benefit.  Radiation of the internal mammary nodes may increase her cardiac dose and I believe in context this would not be a kolb judgment.  After discussion patient is in agreement with regards to the target volumes.  We therefore settled on a fractionation pattern of treating the supraclavicular fossa gently at 180 cGy daily to 4500 cGy which should minimize toxicity, treating the breast and axillary lymph nodes at 200 cGy daily to 4600 cGy followed by a lumpectomy site boost to 6000 cGy all over 30 treatments.    I reviewed with her potential side effects referable to radiation of the supraclavicular fossa and axilla.  With respect to the supraclavicular fossa there is a small risk of a radiation-induced brachial plexopathy manifest as unrelenting pain, loss of function or loss of sensation in the ipsilateral arm.  There is really no good treatment.  With respect to axillary radiation there is a small risk  of lasting lymphedema of the arm at her best defense is to maintain a modest weight.    At discussion of these issues at length patient was very comfortable with proceeding along the lines above.  We will therefore proceed with her treatment planning and anticipate starting her radiation sometime next week

## 2023-04-07 PROCEDURE — 77334 RADIATION TREATMENT AID(S): CPT | Performed by: RADIOLOGY

## 2023-04-07 PROCEDURE — 77300 RADIATION THERAPY DOSE PLAN: CPT | Performed by: RADIOLOGY

## 2023-04-07 PROCEDURE — 77295 3-D RADIOTHERAPY PLAN: CPT | Performed by: RADIOLOGY

## 2023-04-10 PROCEDURE — 77307 TELETHX ISODOSE PLAN CPLX: CPT | Performed by: RADIOLOGY

## 2023-04-10 PROCEDURE — 77295 3-D RADIOTHERAPY PLAN: CPT | Performed by: RADIOLOGY

## 2023-04-10 PROCEDURE — 77334 RADIATION TREATMENT AID(S): CPT | Performed by: RADIOLOGY

## 2023-04-14 ENCOUNTER — DOCUMENTATION (OUTPATIENT)
Dept: ONCOLOGY | Facility: HOSPITAL | Age: 63
End: 2023-04-14
Payer: COMMERCIAL

## 2023-04-14 NOTE — PROGRESS NOTES
SS completed application for [employer] program and faxed application and letter.                                  2023    RE: Terri Foster    : 1960    To Whom It May Concern:        Diagnosis: Malignant neoplasm of upper-outer quadrant of left breast in female, estrogen receptor positive    Oncology History:  Malignant neoplasm of upper-outer quadrant of left breast in female, estrogen receptor positive        Oncology/Hematology History        2022 Initial Diagnosis     Malignant neoplasm of upper-outer quadrant of  left breast in female, estrogen receptor positive     -  Chemotherapy                                        Patient received chemotherapy at [facility]     2023                Radiation Consultation at  and currently receiving                                                  Radiation at      Thank you,    Madelyn Kidd, MSW   Cancer Center  Oncology Social Worker  [phone]

## 2023-04-17 ENCOUNTER — HOSPITAL ENCOUNTER (OUTPATIENT)
Dept: RADIATION ONCOLOGY | Facility: HOSPITAL | Age: 63
Discharge: HOME | End: 2023-04-17

## 2023-04-17 LAB
RAD ONC ARIA COURSE ID: NORMAL
RAD ONC ARIA COURSE INTENT: NORMAL
RAD ONC ARIA COURSE LAST TREATMENT DATE: NORMAL
RAD ONC ARIA COURSE START DATE: NORMAL
RAD ONC ARIA COURSE TREATMENT ELAPSED DAYS: 0
RAD ONC ARIA FIRST TREATMENT DATE: NORMAL
RAD ONC ARIA PLAN FRACTIONS TREATED TO DATE: 1
RAD ONC ARIA PLAN FRACTIONS TREATED TO DATE: 1
RAD ONC ARIA PLAN ID: NORMAL
RAD ONC ARIA PLAN ID: NORMAL
RAD ONC ARIA PLAN NAME: NORMAL
RAD ONC ARIA PLAN NAME: NORMAL
RAD ONC ARIA PLAN PRESCRIBED DOSE PER FRACTION: 1.8 GY
RAD ONC ARIA PLAN PRESCRIBED DOSE PER FRACTION: 2 GY
RAD ONC ARIA PLAN PRIMARY REFERENCE POINT: NORMAL
RAD ONC ARIA PLAN PRIMARY REFERENCE POINT: NORMAL
RAD ONC ARIA PLAN TOTAL FRACTIONS PRESCRIBED: 23
RAD ONC ARIA PLAN TOTAL FRACTIONS PRESCRIBED: 25
RAD ONC ARIA PLAN TOTAL PRESCRIBED DOSE: 4500 CGY
RAD ONC ARIA PLAN TOTAL PRESCRIBED DOSE: 4600 CGY
RAD ONC ARIA REFERENCE POINT DOSAGE GIVEN TO DATE: 1.8 GY
RAD ONC ARIA REFERENCE POINT DOSAGE GIVEN TO DATE: 2 GY
RAD ONC ARIA REFERENCE POINT ID: NORMAL
RAD ONC ARIA REFERENCE POINT ID: NORMAL
RAD ONC ARIA REFERENCE POINT SESSION DOSAGE GIVEN: 1.8 GY
RAD ONC ARIA REFERENCE POINT SESSION DOSAGE GIVEN: 2 GY

## 2023-04-17 PROCEDURE — G6002 STEREOSCOPIC X-RAY GUIDANCE: HCPCS | Performed by: RADIOLOGY

## 2023-04-17 PROCEDURE — 77280 THER RAD SIMULAJ FIELD SMPL: CPT | Performed by: RADIOLOGY

## 2023-04-17 PROCEDURE — 77387 GUIDANCE FOR RADJ TX DLVR: CPT | Performed by: RADIOLOGY

## 2023-04-17 PROCEDURE — 77427 RADIATION TX MANAGEMENT X5: CPT | Performed by: RADIOLOGY

## 2023-04-17 PROCEDURE — 77336 RADIATION PHYSICS CONSULT: CPT | Performed by: RADIOLOGY

## 2023-04-17 PROCEDURE — 77412 RADIATION TX DELIVERY LVL 3: CPT | Performed by: RADIOLOGY

## 2023-04-18 ENCOUNTER — HOSPITAL ENCOUNTER (OUTPATIENT)
Dept: RADIATION ONCOLOGY | Facility: HOSPITAL | Age: 63
Discharge: HOME | End: 2023-04-18

## 2023-04-18 VITALS
OXYGEN SATURATION: 97 % | RESPIRATION RATE: 18 BRPM | SYSTOLIC BLOOD PRESSURE: 119 MMHG | DIASTOLIC BLOOD PRESSURE: 77 MMHG | BODY MASS INDEX: 36.03 KG/M2 | HEART RATE: 71 BPM | TEMPERATURE: 97.6 F | WEIGHT: 203.4 LBS

## 2023-04-18 DIAGNOSIS — C50.412 MALIGNANT NEOPLASM OF UPPER-OUTER QUADRANT OF LEFT BREAST IN FEMALE, ESTROGEN RECEPTOR POSITIVE: Primary | ICD-10-CM

## 2023-04-18 DIAGNOSIS — Z17.0 MALIGNANT NEOPLASM OF UPPER-OUTER QUADRANT OF LEFT BREAST IN FEMALE, ESTROGEN RECEPTOR POSITIVE: Primary | ICD-10-CM

## 2023-04-18 LAB
RAD ONC ARIA COURSE ID: NORMAL
RAD ONC ARIA COURSE INTENT: NORMAL
RAD ONC ARIA COURSE LAST TREATMENT DATE: NORMAL
RAD ONC ARIA COURSE START DATE: NORMAL
RAD ONC ARIA COURSE TREATMENT ELAPSED DAYS: 1
RAD ONC ARIA FIRST TREATMENT DATE: NORMAL
RAD ONC ARIA PLAN FRACTIONS TREATED TO DATE: 2
RAD ONC ARIA PLAN FRACTIONS TREATED TO DATE: 2
RAD ONC ARIA PLAN ID: NORMAL
RAD ONC ARIA PLAN ID: NORMAL
RAD ONC ARIA PLAN NAME: NORMAL
RAD ONC ARIA PLAN NAME: NORMAL
RAD ONC ARIA PLAN PRESCRIBED DOSE PER FRACTION: 1.8 GY
RAD ONC ARIA PLAN PRESCRIBED DOSE PER FRACTION: 2 GY
RAD ONC ARIA PLAN PRIMARY REFERENCE POINT: NORMAL
RAD ONC ARIA PLAN PRIMARY REFERENCE POINT: NORMAL
RAD ONC ARIA PLAN TOTAL FRACTIONS PRESCRIBED: 23
RAD ONC ARIA PLAN TOTAL FRACTIONS PRESCRIBED: 25
RAD ONC ARIA PLAN TOTAL PRESCRIBED DOSE: 4500 CGY
RAD ONC ARIA PLAN TOTAL PRESCRIBED DOSE: 4600 CGY
RAD ONC ARIA REFERENCE POINT DOSAGE GIVEN TO DATE: 3.6 GY
RAD ONC ARIA REFERENCE POINT DOSAGE GIVEN TO DATE: 4 GY
RAD ONC ARIA REFERENCE POINT ID: NORMAL
RAD ONC ARIA REFERENCE POINT ID: NORMAL
RAD ONC ARIA REFERENCE POINT SESSION DOSAGE GIVEN: 1.8 GY
RAD ONC ARIA REFERENCE POINT SESSION DOSAGE GIVEN: 2 GY

## 2023-04-18 PROCEDURE — G6002 STEREOSCOPIC X-RAY GUIDANCE: HCPCS | Performed by: RADIOLOGY

## 2023-04-18 PROCEDURE — 77412 RADIATION TX DELIVERY LVL 3: CPT | Performed by: RADIOLOGY

## 2023-04-18 PROCEDURE — 77387 GUIDANCE FOR RADJ TX DLVR: CPT | Performed by: RADIOLOGY

## 2023-04-18 NOTE — PROGRESS NOTES
On Treatment Visit Note      Patient Name: Terri Foster  : 1960   MRN: [id number]     Diagnosis:    Chief Complaint   Patient presents with   • Breast Cancer     Malignant Neoplasm of Upper-Outer Quadrant of Left Breast      Staging: Stage IIB (yT2 ypN1 cM0) for carcinoma and ductal carcinoma in situ of the left breast, the patient is status post neoadjuvant anastrozole and status post breast conservation surgery.  The patient is receiving breast and regional lymph node radiotherapy to reduce the incidence of local tumor recurrence    This patient was seen today for an on treatment visit.     Radiation Treatments     Active   Plans   Lt Scv [Lt Brst_FiF]   Most recent treatment: Dose planned: 200 cGy (fraction 2 on 2023)   Total: Dose planned: 4,600 cGy (23 fractions)   Elapsed Days: 1      Lt Scv_FiF   Most recent treatment: Dose planned: 180 cGy (fraction 2 on 2023)   Total: Dose planned: 4,500 cGy (25 fractions)   Elapsed Days: 1      Reference Points   Lt SC   Most recent treatment: Dose given: 180 cGy (on 2023)   Total: Dose given: 360 cGy   Elapsed Days: 1      PTV_Breast   Most recent treatment: Dose given: 200 cGy (on 2023)   Total: Dose given: 400 cGy   Elapsed Days: 1         Historical   No historical radiation treatments to show.            Subjective      Review of Systems:   Breast and regional lymph node radiotherapy was initiated yesterday.  Patient states that all of her treatment related questions have been answered to her satisfaction.    Medications:     Current Outpatient Medications:   •  amoxicillin (AMOXIL) 875 MG tablet, , Disp: , Rfl:   •  anastrozole (ARIMIDEX) 1 MG tablet, Take 1 tablet by mouth Daily., Disp: , Rfl:   •  fluticasone (FLONASE) 50 MCG/ACT nasal spray, SHAKE LIQUID AND USE 1 SPRAY IN EACH NOSTRIL EVERY 12 HOURS FOR NASAL CONGESTION, Disp: , Rfl:   •  metFORMIN (GLUCOPHAGE) 500 MG tablet, 1 tablet 2 (Two) Times a Day With Meals., Disp: , Rfl:    •  multivitamin (THERAGRAN) tablet tablet, Take  by mouth Daily., Disp: , Rfl:   •  naloxone (NARCAN) 4 MG/0.1ML nasal spray, Use 1 spray into the nostril(s) as directed by provider As Needed (opioid overdose). Call 911. Don't prime. Repeat in 2-3 min if necessary., Disp: 2 each, Rfl: 0  •  ofloxacin (FLOXIN) 0.3 % otic solution, INSTILL 10 DROPS TO AFFECTED EAR EVERY DAY FOR 5 DAYS, Disp: , Rfl:   •  ondansetron (ZOFRAN) 8 MG tablet, , Disp: , Rfl:   •  pantoprazole (PROTONIX) 40 MG EC tablet, TAKE 1 TABLET BY MOUTH TWICE DAILY FOR STOMACH, Disp: , Rfl:   •  propranolol (INDERAL) 40 MG tablet, , Disp: , Rfl:   •  rosuvastatin (CRESTOR) 10 MG tablet, , Disp: , Rfl:   •  Vitamin D, Cholecalciferol, (CHOLECALCIFEROL) 10 MCG (400 UNIT) tablet, Take 1 tablet by mouth Daily., Disp: , Rfl:   •  vitamin E 400 UNIT capsule, Take  by mouth Daily., Disp: , Rfl:     Allergies:   No Known Allergies  Objective     Physical Exam:  Physical examination was not performed today due to the recent start and absence of radiation therapy associated side effects.    Vital Signs:   Vitals:    04/18/23 0911   BP: 119/77   Pulse: 71   Resp: 18   Temp: 97.6 °F (36.4 °C)   TempSrc: Temporal   SpO2: 97%   Weight: 92.3 kg (203 lb 6.4 oz)   PainSc: 0-No pain     Body mass index is 36.03 kg/m².     Current Total XRT Dose (cGY):    Radiation Treatments     Active   Plans   Lt Scv [Lt Brst_FiF]   Most recent treatment: Dose planned: 200 cGy (fraction 2 on 4/18/2023)   Total: Dose planned: 4,600 cGy (23 fractions)   Elapsed Days: 1      Lt Scv_FiF   Most recent treatment: Dose planned: 180 cGy (fraction 2 on 4/18/2023)   Total: Dose planned: 4,500 cGy (25 fractions)   Elapsed Days: 1      Reference Points   Lt SC   Most recent treatment: Dose given: 180 cGy (on 4/18/2023)   Total: Dose given: 360 cGy   Elapsed Days: 1      PTV_Breast   Most recent treatment: Dose given: 200 cGy (on 4/18/2023)   Total: Dose given: 400 cGy   Elapsed Days: 1          Historical   No historical radiation treatments to show.            Plan      Plan:   Patient was seen today for an on treatment visit. Patient is receiving radiation therapy to the left breast and regional lymph nodes.. Patient is stable and tolerating radiation therapy well with minimal side effects. Continue with radiation therapy.     I reviewed the rationale for postoperative radiotherapy, durations of treatment, expected outcomes, and post radiation surveillance measures with Mrs. Foster.  Possible acute side effects were discussed.    I have reviewed treatment setup notes, checked and approved the daily guidance images. I reviewed dose delivery, treatment parameters and deemed them appropriate. We plan to continue radiation therapy as prescribed.     Digital speech recognition software was used to dictate parts of this note, some dictation errors may occur.    Hiren Orosco MD   04/18/23 09:13 EDT

## 2023-04-19 ENCOUNTER — HOSPITAL ENCOUNTER (OUTPATIENT)
Dept: RADIATION ONCOLOGY | Facility: HOSPITAL | Age: 63
Discharge: HOME | End: 2023-04-19

## 2023-04-19 LAB
RAD ONC ARIA COURSE ID: NORMAL
RAD ONC ARIA COURSE INTENT: NORMAL
RAD ONC ARIA COURSE LAST TREATMENT DATE: NORMAL
RAD ONC ARIA COURSE START DATE: NORMAL
RAD ONC ARIA COURSE TREATMENT ELAPSED DAYS: 2
RAD ONC ARIA FIRST TREATMENT DATE: NORMAL
RAD ONC ARIA PLAN FRACTIONS TREATED TO DATE: 3
RAD ONC ARIA PLAN FRACTIONS TREATED TO DATE: 3
RAD ONC ARIA PLAN ID: NORMAL
RAD ONC ARIA PLAN ID: NORMAL
RAD ONC ARIA PLAN NAME: NORMAL
RAD ONC ARIA PLAN NAME: NORMAL
RAD ONC ARIA PLAN PRESCRIBED DOSE PER FRACTION: 1.8 GY
RAD ONC ARIA PLAN PRESCRIBED DOSE PER FRACTION: 2 GY
RAD ONC ARIA PLAN PRIMARY REFERENCE POINT: NORMAL
RAD ONC ARIA PLAN PRIMARY REFERENCE POINT: NORMAL
RAD ONC ARIA PLAN TOTAL FRACTIONS PRESCRIBED: 23
RAD ONC ARIA PLAN TOTAL FRACTIONS PRESCRIBED: 25
RAD ONC ARIA PLAN TOTAL PRESCRIBED DOSE: 4500 CGY
RAD ONC ARIA PLAN TOTAL PRESCRIBED DOSE: 4600 CGY
RAD ONC ARIA REFERENCE POINT DOSAGE GIVEN TO DATE: 5.4 GY
RAD ONC ARIA REFERENCE POINT DOSAGE GIVEN TO DATE: 6 GY
RAD ONC ARIA REFERENCE POINT ID: NORMAL
RAD ONC ARIA REFERENCE POINT ID: NORMAL
RAD ONC ARIA REFERENCE POINT SESSION DOSAGE GIVEN: 1.8 GY
RAD ONC ARIA REFERENCE POINT SESSION DOSAGE GIVEN: 2 GY

## 2023-04-19 PROCEDURE — 77387 GUIDANCE FOR RADJ TX DLVR: CPT | Performed by: RADIOLOGY

## 2023-04-19 PROCEDURE — G6002 STEREOSCOPIC X-RAY GUIDANCE: HCPCS | Performed by: RADIOLOGY

## 2023-04-19 PROCEDURE — 77412 RADIATION TX DELIVERY LVL 3: CPT | Performed by: RADIOLOGY

## 2023-04-20 ENCOUNTER — HOSPITAL ENCOUNTER (OUTPATIENT)
Dept: RADIATION ONCOLOGY | Facility: HOSPITAL | Age: 63
Discharge: HOME | End: 2023-04-20

## 2023-04-20 LAB
RAD ONC ARIA COURSE ID: NORMAL
RAD ONC ARIA COURSE INTENT: NORMAL
RAD ONC ARIA COURSE LAST TREATMENT DATE: NORMAL
RAD ONC ARIA COURSE START DATE: NORMAL
RAD ONC ARIA COURSE TREATMENT ELAPSED DAYS: 3
RAD ONC ARIA FIRST TREATMENT DATE: NORMAL
RAD ONC ARIA PLAN FRACTIONS TREATED TO DATE: 4
RAD ONC ARIA PLAN FRACTIONS TREATED TO DATE: 4
RAD ONC ARIA PLAN ID: NORMAL
RAD ONC ARIA PLAN ID: NORMAL
RAD ONC ARIA PLAN NAME: NORMAL
RAD ONC ARIA PLAN NAME: NORMAL
RAD ONC ARIA PLAN PRESCRIBED DOSE PER FRACTION: 1.8 GY
RAD ONC ARIA PLAN PRESCRIBED DOSE PER FRACTION: 2 GY
RAD ONC ARIA PLAN PRIMARY REFERENCE POINT: NORMAL
RAD ONC ARIA PLAN PRIMARY REFERENCE POINT: NORMAL
RAD ONC ARIA PLAN TOTAL FRACTIONS PRESCRIBED: 23
RAD ONC ARIA PLAN TOTAL FRACTIONS PRESCRIBED: 25
RAD ONC ARIA PLAN TOTAL PRESCRIBED DOSE: 4500 CGY
RAD ONC ARIA PLAN TOTAL PRESCRIBED DOSE: 4600 CGY
RAD ONC ARIA REFERENCE POINT DOSAGE GIVEN TO DATE: 7.2 GY
RAD ONC ARIA REFERENCE POINT DOSAGE GIVEN TO DATE: 8 GY
RAD ONC ARIA REFERENCE POINT ID: NORMAL
RAD ONC ARIA REFERENCE POINT ID: NORMAL
RAD ONC ARIA REFERENCE POINT SESSION DOSAGE GIVEN: 1.8 GY
RAD ONC ARIA REFERENCE POINT SESSION DOSAGE GIVEN: 2 GY

## 2023-04-20 PROCEDURE — 77387 GUIDANCE FOR RADJ TX DLVR: CPT | Performed by: RADIOLOGY

## 2023-04-20 PROCEDURE — 77412 RADIATION TX DELIVERY LVL 3: CPT | Performed by: RADIOLOGY

## 2023-04-20 PROCEDURE — G6002 STEREOSCOPIC X-RAY GUIDANCE: HCPCS | Performed by: RADIOLOGY

## 2023-04-21 ENCOUNTER — HOSPITAL ENCOUNTER (OUTPATIENT)
Dept: RADIATION ONCOLOGY | Facility: HOSPITAL | Age: 63
Discharge: HOME | End: 2023-04-21

## 2023-04-21 LAB
RAD ONC ARIA COURSE ID: NORMAL
RAD ONC ARIA COURSE INTENT: NORMAL
RAD ONC ARIA COURSE LAST TREATMENT DATE: NORMAL
RAD ONC ARIA COURSE START DATE: NORMAL
RAD ONC ARIA COURSE TREATMENT ELAPSED DAYS: 4
RAD ONC ARIA FIRST TREATMENT DATE: NORMAL
RAD ONC ARIA PLAN FRACTIONS TREATED TO DATE: 5
RAD ONC ARIA PLAN FRACTIONS TREATED TO DATE: 5
RAD ONC ARIA PLAN ID: NORMAL
RAD ONC ARIA PLAN ID: NORMAL
RAD ONC ARIA PLAN NAME: NORMAL
RAD ONC ARIA PLAN NAME: NORMAL
RAD ONC ARIA PLAN PRESCRIBED DOSE PER FRACTION: 1.8 GY
RAD ONC ARIA PLAN PRESCRIBED DOSE PER FRACTION: 2 GY
RAD ONC ARIA PLAN PRIMARY REFERENCE POINT: NORMAL
RAD ONC ARIA PLAN PRIMARY REFERENCE POINT: NORMAL
RAD ONC ARIA PLAN TOTAL FRACTIONS PRESCRIBED: 23
RAD ONC ARIA PLAN TOTAL FRACTIONS PRESCRIBED: 25
RAD ONC ARIA PLAN TOTAL PRESCRIBED DOSE: 4500 CGY
RAD ONC ARIA PLAN TOTAL PRESCRIBED DOSE: 4600 CGY
RAD ONC ARIA REFERENCE POINT DOSAGE GIVEN TO DATE: 10 GY
RAD ONC ARIA REFERENCE POINT DOSAGE GIVEN TO DATE: 9 GY
RAD ONC ARIA REFERENCE POINT ID: NORMAL
RAD ONC ARIA REFERENCE POINT ID: NORMAL
RAD ONC ARIA REFERENCE POINT SESSION DOSAGE GIVEN: 1.8 GY
RAD ONC ARIA REFERENCE POINT SESSION DOSAGE GIVEN: 2 GY

## 2023-04-21 PROCEDURE — G6002 STEREOSCOPIC X-RAY GUIDANCE: HCPCS | Performed by: RADIOLOGY

## 2023-04-21 PROCEDURE — 77387 GUIDANCE FOR RADJ TX DLVR: CPT | Performed by: RADIOLOGY

## 2023-04-21 PROCEDURE — 77412 RADIATION TX DELIVERY LVL 3: CPT | Performed by: RADIOLOGY

## 2023-04-24 ENCOUNTER — HOSPITAL ENCOUNTER (OUTPATIENT)
Dept: RADIATION ONCOLOGY | Facility: HOSPITAL | Age: 63
Discharge: HOME | End: 2023-04-24

## 2023-04-24 LAB
RAD ONC ARIA COURSE ID: NORMAL
RAD ONC ARIA COURSE INTENT: NORMAL
RAD ONC ARIA COURSE LAST TREATMENT DATE: NORMAL
RAD ONC ARIA COURSE START DATE: NORMAL
RAD ONC ARIA COURSE TREATMENT ELAPSED DAYS: 7
RAD ONC ARIA FIRST TREATMENT DATE: NORMAL
RAD ONC ARIA PLAN FRACTIONS TREATED TO DATE: 6
RAD ONC ARIA PLAN FRACTIONS TREATED TO DATE: 6
RAD ONC ARIA PLAN ID: NORMAL
RAD ONC ARIA PLAN ID: NORMAL
RAD ONC ARIA PLAN NAME: NORMAL
RAD ONC ARIA PLAN NAME: NORMAL
RAD ONC ARIA PLAN PRESCRIBED DOSE PER FRACTION: 1.8 GY
RAD ONC ARIA PLAN PRESCRIBED DOSE PER FRACTION: 2 GY
RAD ONC ARIA PLAN PRIMARY REFERENCE POINT: NORMAL
RAD ONC ARIA PLAN PRIMARY REFERENCE POINT: NORMAL
RAD ONC ARIA PLAN TOTAL FRACTIONS PRESCRIBED: 23
RAD ONC ARIA PLAN TOTAL FRACTIONS PRESCRIBED: 25
RAD ONC ARIA PLAN TOTAL PRESCRIBED DOSE: 4500 CGY
RAD ONC ARIA PLAN TOTAL PRESCRIBED DOSE: 4600 CGY
RAD ONC ARIA REFERENCE POINT DOSAGE GIVEN TO DATE: 10.8 GY
RAD ONC ARIA REFERENCE POINT DOSAGE GIVEN TO DATE: 12 GY
RAD ONC ARIA REFERENCE POINT ID: NORMAL
RAD ONC ARIA REFERENCE POINT ID: NORMAL
RAD ONC ARIA REFERENCE POINT SESSION DOSAGE GIVEN: 1.8 GY
RAD ONC ARIA REFERENCE POINT SESSION DOSAGE GIVEN: 2 GY

## 2023-04-24 PROCEDURE — G6002 STEREOSCOPIC X-RAY GUIDANCE: HCPCS | Performed by: RADIOLOGY

## 2023-04-24 PROCEDURE — 77412 RADIATION TX DELIVERY LVL 3: CPT | Performed by: RADIOLOGY

## 2023-04-24 PROCEDURE — 77427 RADIATION TX MANAGEMENT X5: CPT | Performed by: RADIOLOGY

## 2023-04-24 PROCEDURE — 77336 RADIATION PHYSICS CONSULT: CPT | Performed by: RADIOLOGY

## 2023-04-24 PROCEDURE — 77387 GUIDANCE FOR RADJ TX DLVR: CPT | Performed by: RADIOLOGY

## 2023-04-25 ENCOUNTER — HOSPITAL ENCOUNTER (OUTPATIENT)
Dept: RADIATION ONCOLOGY | Facility: HOSPITAL | Age: 63
Discharge: HOME | End: 2023-04-25

## 2023-04-25 VITALS
WEIGHT: 197.8 LBS | BODY MASS INDEX: 35.04 KG/M2 | SYSTOLIC BLOOD PRESSURE: 127 MMHG | RESPIRATION RATE: 18 BRPM | TEMPERATURE: 98 F | HEART RATE: 71 BPM | DIASTOLIC BLOOD PRESSURE: 81 MMHG | OXYGEN SATURATION: 96 %

## 2023-04-25 DIAGNOSIS — Z17.0 MALIGNANT NEOPLASM OF UPPER-OUTER QUADRANT OF LEFT BREAST IN FEMALE, ESTROGEN RECEPTOR POSITIVE: Primary | ICD-10-CM

## 2023-04-25 DIAGNOSIS — C50.412 MALIGNANT NEOPLASM OF UPPER-OUTER QUADRANT OF LEFT BREAST IN FEMALE, ESTROGEN RECEPTOR POSITIVE: Primary | ICD-10-CM

## 2023-04-25 LAB
RAD ONC ARIA COURSE ID: NORMAL
RAD ONC ARIA COURSE INTENT: NORMAL
RAD ONC ARIA COURSE LAST TREATMENT DATE: NORMAL
RAD ONC ARIA COURSE START DATE: NORMAL
RAD ONC ARIA COURSE TREATMENT ELAPSED DAYS: 8
RAD ONC ARIA FIRST TREATMENT DATE: NORMAL
RAD ONC ARIA PLAN FRACTIONS TREATED TO DATE: 7
RAD ONC ARIA PLAN FRACTIONS TREATED TO DATE: 7
RAD ONC ARIA PLAN ID: NORMAL
RAD ONC ARIA PLAN ID: NORMAL
RAD ONC ARIA PLAN NAME: NORMAL
RAD ONC ARIA PLAN NAME: NORMAL
RAD ONC ARIA PLAN PRESCRIBED DOSE PER FRACTION: 1.8 GY
RAD ONC ARIA PLAN PRESCRIBED DOSE PER FRACTION: 2 GY
RAD ONC ARIA PLAN PRIMARY REFERENCE POINT: NORMAL
RAD ONC ARIA PLAN PRIMARY REFERENCE POINT: NORMAL
RAD ONC ARIA PLAN TOTAL FRACTIONS PRESCRIBED: 23
RAD ONC ARIA PLAN TOTAL FRACTIONS PRESCRIBED: 25
RAD ONC ARIA PLAN TOTAL PRESCRIBED DOSE: 4500 CGY
RAD ONC ARIA PLAN TOTAL PRESCRIBED DOSE: 4600 CGY
RAD ONC ARIA REFERENCE POINT DOSAGE GIVEN TO DATE: 12.6 GY
RAD ONC ARIA REFERENCE POINT DOSAGE GIVEN TO DATE: 14 GY
RAD ONC ARIA REFERENCE POINT ID: NORMAL
RAD ONC ARIA REFERENCE POINT ID: NORMAL
RAD ONC ARIA REFERENCE POINT SESSION DOSAGE GIVEN: 1.8 GY
RAD ONC ARIA REFERENCE POINT SESSION DOSAGE GIVEN: 2 GY

## 2023-04-25 PROCEDURE — 77387 GUIDANCE FOR RADJ TX DLVR: CPT | Performed by: RADIOLOGY

## 2023-04-25 PROCEDURE — G6002 STEREOSCOPIC X-RAY GUIDANCE: HCPCS | Performed by: RADIOLOGY

## 2023-04-25 PROCEDURE — 77412 RADIATION TX DELIVERY LVL 3: CPT | Performed by: RADIOLOGY

## 2023-04-25 NOTE — PROGRESS NOTES
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  The patient is receiving palliative radiation therapy to the left clavicle.  Today the patient has received 8 of 10 fractions for 2400 cGy out of a planned 3000 cGy in 10 fractions    Chief Complaint   Patient presents with   • Breast Cancer     Malignant Neoplasm of Upper-Outer Quadrant of Left Breast       Staging: No matching staging information was found for the patient.     This patient was seen today for an on treatment visit.  The patient states that the pain in her left clavicle has improved.  She had a port placed for chemotherapy this past Thursday.  She has no significant skin reaction.    She is tolerating her radiation treatments well and will continue as planned  Jenifer Chase MD    Radiation Treatments     Active   Plans   Lt Scv [Lt Brst_FiF]   Most recent treatment: Dose planned: 200 cGy (fraction 7 on 2023)   Total: Dose planned: 4,600 cGy (23 fractions)   Elapsed Days: 8      Lt Scv_FiF   Most recent treatment: Dose planned: 180 cGy (fraction 7 on 2023)   Total: Dose planned: 4,500 cGy (25 fractions)   Elapsed Days: 8      Reference Points   Lt SC   Most recent treatment: Dose given: 180 cGy (on 2023)   Total: Dose given: 1,260 cGy   Elapsed Days: 8      PTV_Breast   Most recent treatment: Dose given: 200 cGy (on 2023)   Total: Dose given: 1,400 cGy   Elapsed Days: 8         Historical   No historical radiation treatments to show.            Subjective      Review of Systems:   Review of Systems    Medications:     Current Outpatient Medications:   •  amoxicillin (AMOXIL) 875 MG tablet, , Disp: , Rfl:   •  anastrozole (ARIMIDEX) 1 MG tablet, Take 1 tablet by mouth Daily., Disp: , Rfl:   •  fluticasone (FLONASE) 50 MCG/ACT nasal spray, SHAKE LIQUID AND USE 1 SPRAY  IN EACH NOSTRIL EVERY 12 HOURS FOR NASAL CONGESTION, Disp: , Rfl:   •  metFORMIN (GLUCOPHAGE) 500 MG tablet, 1 tablet 2 (Two) Times a Day With Meals., Disp: , Rfl:   •  multivitamin (THERAGRAN) tablet tablet, Take  by mouth Daily., Disp: , Rfl:   •  naloxone (NARCAN) 4 MG/0.1ML nasal spray, Use 1 spray into the nostril(s) as directed by provider As Needed (opioid overdose). Call 911. Don't prime. Repeat in 2-3 min if necessary., Disp: 2 each, Rfl: 0  •  ofloxacin (FLOXIN) 0.3 % otic solution, INSTILL 10 DROPS TO AFFECTED EAR EVERY DAY FOR 5 DAYS, Disp: , Rfl:   •  ondansetron (ZOFRAN) 8 MG tablet, , Disp: , Rfl:   •  pantoprazole (PROTONIX) 40 MG EC tablet, TAKE 1 TABLET BY MOUTH TWICE DAILY FOR STOMACH, Disp: , Rfl:   •  propranolol (INDERAL) 40 MG tablet, , Disp: , Rfl:   •  rosuvastatin (CRESTOR) 10 MG tablet, , Disp: , Rfl:   •  Vitamin D, Cholecalciferol, (CHOLECALCIFEROL) 10 MCG (400 UNIT) tablet, Take 1 tablet by mouth Daily., Disp: , Rfl:   •  vitamin E 400 UNIT capsule, Take  by mouth Daily., Disp: , Rfl:     Allergies:   No Known Allergies  Objective     Physical Exam:  Physical Exam    Vital Signs:   Vitals:    04/25/23 0929   BP: 127/81   Pulse: 71   Resp: 18   Temp: 98 °F (36.7 °C)   TempSrc: Temporal   SpO2: 96%   Weight: 89.7 kg (197 lb 12.8 oz)   PainSc: 0-No pain     Body mass index is 35.04 kg/m².     Current Total XRT Dose (cGY):    Radiation Treatments     Active   Plans   Lt Scv [Lt Brst_FiF]   Most recent treatment: Dose planned: 200 cGy (fraction 7 on 4/25/2023)   Total: Dose planned: 4,600 cGy (23 fractions)   Elapsed Days: 8      Lt Scv_FiF   Most recent treatment: Dose planned: 180 cGy (fraction 7 on 4/25/2023)   Total: Dose planned: 4,500 cGy (25 fractions)   Elapsed Days: 8      Reference Points   Lt SC   Most recent treatment: Dose given: 180 cGy (on 4/25/2023)   Total: Dose given: 1,260 cGy   Elapsed Days: 8      PTV_Breast   Most recent treatment: Dose given: 200 cGy (on 4/25/2023)    Total: Dose given: 1,400 cGy   Elapsed Days: 8         Historical   No historical radiation treatments to show.            Plan      Plan:   Patient was seen today for an on treatment visit. Patient is receiving radiation therapy to the left breast.. Patient is stable and tolerating radiation therapy well with minimal side effects. Continue with radiation therapy.     I have reviewed treatment setup notes, checked and approved the daily guidance images. I reviewed dose delivery, treatment parameters and deemed them appropriate. We plan to continue radiation therapy as prescribed.     Digital speech recognition software was used to dictate parts of this note, some dictation errors may occur.    Jenifer Damon MD   04/25/23 10:06 EDT

## 2023-04-26 ENCOUNTER — HOSPITAL ENCOUNTER (OUTPATIENT)
Dept: RADIATION ONCOLOGY | Facility: HOSPITAL | Age: 63
Discharge: HOME | End: 2023-04-26

## 2023-04-26 LAB
RAD ONC ARIA COURSE ID: NORMAL
RAD ONC ARIA COURSE INTENT: NORMAL
RAD ONC ARIA COURSE LAST TREATMENT DATE: NORMAL
RAD ONC ARIA COURSE START DATE: NORMAL
RAD ONC ARIA COURSE TREATMENT ELAPSED DAYS: 9
RAD ONC ARIA FIRST TREATMENT DATE: NORMAL
RAD ONC ARIA PLAN FRACTIONS TREATED TO DATE: 8
RAD ONC ARIA PLAN FRACTIONS TREATED TO DATE: 8
RAD ONC ARIA PLAN ID: NORMAL
RAD ONC ARIA PLAN ID: NORMAL
RAD ONC ARIA PLAN NAME: NORMAL
RAD ONC ARIA PLAN NAME: NORMAL
RAD ONC ARIA PLAN PRESCRIBED DOSE PER FRACTION: 1.8 GY
RAD ONC ARIA PLAN PRESCRIBED DOSE PER FRACTION: 2 GY
RAD ONC ARIA PLAN PRIMARY REFERENCE POINT: NORMAL
RAD ONC ARIA PLAN PRIMARY REFERENCE POINT: NORMAL
RAD ONC ARIA PLAN TOTAL FRACTIONS PRESCRIBED: 23
RAD ONC ARIA PLAN TOTAL FRACTIONS PRESCRIBED: 25
RAD ONC ARIA PLAN TOTAL PRESCRIBED DOSE: 4500 CGY
RAD ONC ARIA PLAN TOTAL PRESCRIBED DOSE: 4600 CGY
RAD ONC ARIA REFERENCE POINT DOSAGE GIVEN TO DATE: 14.4 GY
RAD ONC ARIA REFERENCE POINT DOSAGE GIVEN TO DATE: 16 GY
RAD ONC ARIA REFERENCE POINT ID: NORMAL
RAD ONC ARIA REFERENCE POINT ID: NORMAL
RAD ONC ARIA REFERENCE POINT SESSION DOSAGE GIVEN: 1.8 GY
RAD ONC ARIA REFERENCE POINT SESSION DOSAGE GIVEN: 2 GY

## 2023-04-26 PROCEDURE — G6002 STEREOSCOPIC X-RAY GUIDANCE: HCPCS | Performed by: RADIOLOGY

## 2023-04-26 PROCEDURE — 77412 RADIATION TX DELIVERY LVL 3: CPT | Performed by: RADIOLOGY

## 2023-04-26 PROCEDURE — 77387 GUIDANCE FOR RADJ TX DLVR: CPT | Performed by: RADIOLOGY

## 2023-04-27 ENCOUNTER — HOSPITAL ENCOUNTER (OUTPATIENT)
Dept: RADIATION ONCOLOGY | Facility: HOSPITAL | Age: 63
Discharge: HOME | End: 2023-04-27

## 2023-04-27 LAB
RAD ONC ARIA COURSE ID: NORMAL
RAD ONC ARIA COURSE INTENT: NORMAL
RAD ONC ARIA COURSE LAST TREATMENT DATE: NORMAL
RAD ONC ARIA COURSE START DATE: NORMAL
RAD ONC ARIA COURSE TREATMENT ELAPSED DAYS: 10
RAD ONC ARIA FIRST TREATMENT DATE: NORMAL
RAD ONC ARIA PLAN FRACTIONS TREATED TO DATE: 9
RAD ONC ARIA PLAN FRACTIONS TREATED TO DATE: 9
RAD ONC ARIA PLAN ID: NORMAL
RAD ONC ARIA PLAN ID: NORMAL
RAD ONC ARIA PLAN NAME: NORMAL
RAD ONC ARIA PLAN NAME: NORMAL
RAD ONC ARIA PLAN PRESCRIBED DOSE PER FRACTION: 1.8 GY
RAD ONC ARIA PLAN PRESCRIBED DOSE PER FRACTION: 2 GY
RAD ONC ARIA PLAN PRIMARY REFERENCE POINT: NORMAL
RAD ONC ARIA PLAN PRIMARY REFERENCE POINT: NORMAL
RAD ONC ARIA PLAN TOTAL FRACTIONS PRESCRIBED: 23
RAD ONC ARIA PLAN TOTAL FRACTIONS PRESCRIBED: 25
RAD ONC ARIA PLAN TOTAL PRESCRIBED DOSE: 4500 CGY
RAD ONC ARIA PLAN TOTAL PRESCRIBED DOSE: 4600 CGY
RAD ONC ARIA REFERENCE POINT DOSAGE GIVEN TO DATE: 16.2 GY
RAD ONC ARIA REFERENCE POINT DOSAGE GIVEN TO DATE: 18 GY
RAD ONC ARIA REFERENCE POINT ID: NORMAL
RAD ONC ARIA REFERENCE POINT ID: NORMAL
RAD ONC ARIA REFERENCE POINT SESSION DOSAGE GIVEN: 1.8 GY
RAD ONC ARIA REFERENCE POINT SESSION DOSAGE GIVEN: 2 GY

## 2023-04-27 PROCEDURE — G6002 STEREOSCOPIC X-RAY GUIDANCE: HCPCS | Performed by: RADIOLOGY

## 2023-04-27 PROCEDURE — 77412 RADIATION TX DELIVERY LVL 3: CPT | Performed by: RADIOLOGY

## 2023-04-27 PROCEDURE — 77387 GUIDANCE FOR RADJ TX DLVR: CPT | Performed by: RADIOLOGY

## 2023-04-28 ENCOUNTER — HOSPITAL ENCOUNTER (OUTPATIENT)
Dept: RADIATION ONCOLOGY | Facility: HOSPITAL | Age: 63
Discharge: HOME | End: 2023-04-28

## 2023-04-28 LAB
RAD ONC ARIA COURSE ID: NORMAL
RAD ONC ARIA COURSE INTENT: NORMAL
RAD ONC ARIA COURSE LAST TREATMENT DATE: NORMAL
RAD ONC ARIA COURSE START DATE: NORMAL
RAD ONC ARIA COURSE TREATMENT ELAPSED DAYS: 11
RAD ONC ARIA FIRST TREATMENT DATE: NORMAL
RAD ONC ARIA PLAN FRACTIONS TREATED TO DATE: 10
RAD ONC ARIA PLAN FRACTIONS TREATED TO DATE: 10
RAD ONC ARIA PLAN ID: NORMAL
RAD ONC ARIA PLAN ID: NORMAL
RAD ONC ARIA PLAN NAME: NORMAL
RAD ONC ARIA PLAN NAME: NORMAL
RAD ONC ARIA PLAN PRESCRIBED DOSE PER FRACTION: 1.8 GY
RAD ONC ARIA PLAN PRESCRIBED DOSE PER FRACTION: 2 GY
RAD ONC ARIA PLAN PRIMARY REFERENCE POINT: NORMAL
RAD ONC ARIA PLAN PRIMARY REFERENCE POINT: NORMAL
RAD ONC ARIA PLAN TOTAL FRACTIONS PRESCRIBED: 23
RAD ONC ARIA PLAN TOTAL FRACTIONS PRESCRIBED: 25
RAD ONC ARIA PLAN TOTAL PRESCRIBED DOSE: 4500 CGY
RAD ONC ARIA PLAN TOTAL PRESCRIBED DOSE: 4600 CGY
RAD ONC ARIA REFERENCE POINT DOSAGE GIVEN TO DATE: 18 GY
RAD ONC ARIA REFERENCE POINT DOSAGE GIVEN TO DATE: 20 GY
RAD ONC ARIA REFERENCE POINT ID: NORMAL
RAD ONC ARIA REFERENCE POINT ID: NORMAL
RAD ONC ARIA REFERENCE POINT SESSION DOSAGE GIVEN: 1.8 GY
RAD ONC ARIA REFERENCE POINT SESSION DOSAGE GIVEN: 2 GY

## 2023-04-28 PROCEDURE — 77412 RADIATION TX DELIVERY LVL 3: CPT | Performed by: RADIOLOGY

## 2023-04-28 PROCEDURE — 77387 GUIDANCE FOR RADJ TX DLVR: CPT | Performed by: RADIOLOGY

## 2023-04-28 PROCEDURE — G6002 STEREOSCOPIC X-RAY GUIDANCE: HCPCS | Performed by: RADIOLOGY

## 2023-05-01 ENCOUNTER — HOSPITAL ENCOUNTER (OUTPATIENT)
Dept: RADIATION ONCOLOGY | Facility: HOSPITAL | Age: 63
Discharge: HOME | End: 2023-05-01

## 2023-05-01 ENCOUNTER — HOSPITAL ENCOUNTER (OUTPATIENT)
Dept: RADIATION ONCOLOGY | Facility: HOSPITAL | Age: 63
Setting detail: RADIATION/ONCOLOGY SERIES
End: 2023-05-01
Payer: COMMERCIAL

## 2023-05-01 LAB
RAD ONC ARIA COURSE ID: NORMAL
RAD ONC ARIA COURSE INTENT: NORMAL
RAD ONC ARIA COURSE LAST TREATMENT DATE: NORMAL
RAD ONC ARIA COURSE START DATE: NORMAL
RAD ONC ARIA COURSE TREATMENT ELAPSED DAYS: 14
RAD ONC ARIA FIRST TREATMENT DATE: NORMAL
RAD ONC ARIA PLAN FRACTIONS TREATED TO DATE: 11
RAD ONC ARIA PLAN FRACTIONS TREATED TO DATE: 11
RAD ONC ARIA PLAN ID: NORMAL
RAD ONC ARIA PLAN ID: NORMAL
RAD ONC ARIA PLAN NAME: NORMAL
RAD ONC ARIA PLAN NAME: NORMAL
RAD ONC ARIA PLAN PRESCRIBED DOSE PER FRACTION: 1.8 GY
RAD ONC ARIA PLAN PRESCRIBED DOSE PER FRACTION: 2 GY
RAD ONC ARIA PLAN PRIMARY REFERENCE POINT: NORMAL
RAD ONC ARIA PLAN PRIMARY REFERENCE POINT: NORMAL
RAD ONC ARIA PLAN TOTAL FRACTIONS PRESCRIBED: 23
RAD ONC ARIA PLAN TOTAL FRACTIONS PRESCRIBED: 25
RAD ONC ARIA PLAN TOTAL PRESCRIBED DOSE: 4500 CGY
RAD ONC ARIA PLAN TOTAL PRESCRIBED DOSE: 4600 CGY
RAD ONC ARIA REFERENCE POINT DOSAGE GIVEN TO DATE: 19.8 GY
RAD ONC ARIA REFERENCE POINT DOSAGE GIVEN TO DATE: 22 GY
RAD ONC ARIA REFERENCE POINT ID: NORMAL
RAD ONC ARIA REFERENCE POINT ID: NORMAL
RAD ONC ARIA REFERENCE POINT SESSION DOSAGE GIVEN: 1.8 GY
RAD ONC ARIA REFERENCE POINT SESSION DOSAGE GIVEN: 2 GY

## 2023-05-01 PROCEDURE — 77387 GUIDANCE FOR RADJ TX DLVR: CPT | Performed by: RADIOLOGY

## 2023-05-01 PROCEDURE — 77412 RADIATION TX DELIVERY LVL 3: CPT | Performed by: RADIOLOGY

## 2023-05-01 PROCEDURE — 77427 RADIATION TX MANAGEMENT X5: CPT | Performed by: RADIOLOGY

## 2023-05-01 PROCEDURE — G6002 STEREOSCOPIC X-RAY GUIDANCE: HCPCS | Performed by: RADIOLOGY

## 2023-05-01 PROCEDURE — 77336 RADIATION PHYSICS CONSULT: CPT | Performed by: RADIOLOGY

## 2023-05-04 ENCOUNTER — HOSPITAL ENCOUNTER (OUTPATIENT)
Dept: RADIATION ONCOLOGY | Facility: HOSPITAL | Age: 63
Discharge: HOME | End: 2023-05-04

## 2023-05-04 LAB
RAD ONC ARIA COURSE ID: NORMAL
RAD ONC ARIA COURSE INTENT: NORMAL
RAD ONC ARIA COURSE LAST TREATMENT DATE: NORMAL
RAD ONC ARIA COURSE START DATE: NORMAL
RAD ONC ARIA COURSE TREATMENT ELAPSED DAYS: 17
RAD ONC ARIA FIRST TREATMENT DATE: NORMAL
RAD ONC ARIA PLAN FRACTIONS TREATED TO DATE: 12
RAD ONC ARIA PLAN FRACTIONS TREATED TO DATE: 12
RAD ONC ARIA PLAN ID: NORMAL
RAD ONC ARIA PLAN ID: NORMAL
RAD ONC ARIA PLAN NAME: NORMAL
RAD ONC ARIA PLAN NAME: NORMAL
RAD ONC ARIA PLAN PRESCRIBED DOSE PER FRACTION: 1.8 GY
RAD ONC ARIA PLAN PRESCRIBED DOSE PER FRACTION: 2 GY
RAD ONC ARIA PLAN PRIMARY REFERENCE POINT: NORMAL
RAD ONC ARIA PLAN PRIMARY REFERENCE POINT: NORMAL
RAD ONC ARIA PLAN TOTAL FRACTIONS PRESCRIBED: 23
RAD ONC ARIA PLAN TOTAL FRACTIONS PRESCRIBED: 25
RAD ONC ARIA PLAN TOTAL PRESCRIBED DOSE: 4500 CGY
RAD ONC ARIA PLAN TOTAL PRESCRIBED DOSE: 4600 CGY
RAD ONC ARIA REFERENCE POINT DOSAGE GIVEN TO DATE: 21.6 GY
RAD ONC ARIA REFERENCE POINT DOSAGE GIVEN TO DATE: 24 GY
RAD ONC ARIA REFERENCE POINT ID: NORMAL
RAD ONC ARIA REFERENCE POINT ID: NORMAL
RAD ONC ARIA REFERENCE POINT SESSION DOSAGE GIVEN: 1.8 GY
RAD ONC ARIA REFERENCE POINT SESSION DOSAGE GIVEN: 2 GY

## 2023-05-04 PROCEDURE — 77412 RADIATION TX DELIVERY LVL 3: CPT | Performed by: RADIOLOGY

## 2023-05-04 PROCEDURE — 77387 GUIDANCE FOR RADJ TX DLVR: CPT | Performed by: RADIOLOGY

## 2023-05-04 PROCEDURE — G6002 STEREOSCOPIC X-RAY GUIDANCE: HCPCS | Performed by: RADIOLOGY

## 2023-05-05 ENCOUNTER — HOSPITAL ENCOUNTER (OUTPATIENT)
Dept: RADIATION ONCOLOGY | Facility: HOSPITAL | Age: 63
Setting detail: RADIATION/ONCOLOGY SERIES
Discharge: HOME | End: 2023-05-05
Payer: COMMERCIAL

## 2023-05-05 VITALS
RESPIRATION RATE: 18 BRPM | DIASTOLIC BLOOD PRESSURE: 81 MMHG | TEMPERATURE: 97.1 F | WEIGHT: 197 LBS | OXYGEN SATURATION: 98 % | HEART RATE: 68 BPM | SYSTOLIC BLOOD PRESSURE: 145 MMHG | BODY MASS INDEX: 34.9 KG/M2

## 2023-05-05 DIAGNOSIS — Z17.0 MALIGNANT NEOPLASM OF UPPER-OUTER QUADRANT OF LEFT BREAST IN FEMALE, ESTROGEN RECEPTOR POSITIVE: Primary | ICD-10-CM

## 2023-05-05 DIAGNOSIS — C50.412 MALIGNANT NEOPLASM OF UPPER-OUTER QUADRANT OF LEFT BREAST IN FEMALE, ESTROGEN RECEPTOR POSITIVE: Primary | ICD-10-CM

## 2023-05-05 LAB
RAD ONC ARIA COURSE ID: NORMAL
RAD ONC ARIA COURSE INTENT: NORMAL
RAD ONC ARIA COURSE LAST TREATMENT DATE: NORMAL
RAD ONC ARIA COURSE START DATE: NORMAL
RAD ONC ARIA COURSE TREATMENT ELAPSED DAYS: 18
RAD ONC ARIA FIRST TREATMENT DATE: NORMAL
RAD ONC ARIA PLAN FRACTIONS TREATED TO DATE: 13
RAD ONC ARIA PLAN FRACTIONS TREATED TO DATE: 13
RAD ONC ARIA PLAN ID: NORMAL
RAD ONC ARIA PLAN ID: NORMAL
RAD ONC ARIA PLAN NAME: NORMAL
RAD ONC ARIA PLAN NAME: NORMAL
RAD ONC ARIA PLAN PRESCRIBED DOSE PER FRACTION: 1.8 GY
RAD ONC ARIA PLAN PRESCRIBED DOSE PER FRACTION: 2 GY
RAD ONC ARIA PLAN PRIMARY REFERENCE POINT: NORMAL
RAD ONC ARIA PLAN PRIMARY REFERENCE POINT: NORMAL
RAD ONC ARIA PLAN TOTAL FRACTIONS PRESCRIBED: 23
RAD ONC ARIA PLAN TOTAL FRACTIONS PRESCRIBED: 25
RAD ONC ARIA PLAN TOTAL PRESCRIBED DOSE: 4500 CGY
RAD ONC ARIA PLAN TOTAL PRESCRIBED DOSE: 4600 CGY
RAD ONC ARIA REFERENCE POINT DOSAGE GIVEN TO DATE: 23.4 GY
RAD ONC ARIA REFERENCE POINT DOSAGE GIVEN TO DATE: 26 GY
RAD ONC ARIA REFERENCE POINT ID: NORMAL
RAD ONC ARIA REFERENCE POINT ID: NORMAL
RAD ONC ARIA REFERENCE POINT SESSION DOSAGE GIVEN: 1.8 GY
RAD ONC ARIA REFERENCE POINT SESSION DOSAGE GIVEN: 2 GY

## 2023-05-05 PROCEDURE — 77387 GUIDANCE FOR RADJ TX DLVR: CPT | Performed by: RADIOLOGY

## 2023-05-05 PROCEDURE — 77412 RADIATION TX DELIVERY LVL 3: CPT | Performed by: RADIOLOGY

## 2023-05-05 PROCEDURE — G6002 STEREOSCOPIC X-RAY GUIDANCE: HCPCS | Performed by: RADIOLOGY

## 2023-05-05 NOTE — PROGRESS NOTES
On Treatment Visit Note      Patient Name: Terri Foster  : 1960   MRN: [id number]     Diagnosis:    Chief Complaint   Patient presents with   • Breast Cancer     Malignant Neoplasm of Upper-Outer Quadrant of Left Breast      Stage IIB (ypT2 ypN1sn cM0) ductal carcinoma and DCIS of the left breast.  The patient is status post neoadjuvant anastrozole and breast conservation surgery.    This patient was seen today for an on treatment visit.     Radiation Treatments     Active   Plans   Lt Scv [Lt Brst_FiF]   Most recent treatment: Dose planned: 200 cGy (fraction 12 on 2023)   Total: Dose planned: 4,600 cGy (23 fractions)   Elapsed Days: 17      Lt Scv_FiF   Most recent treatment: Dose planned: 180 cGy (fraction 12 on 2023)   Total: Dose planned: 4,500 cGy (25 fractions)   Elapsed Days: 17      Reference Points   Lt SC   Most recent treatment: Dose given: 180 cGy (on 2023)   Total: Dose given: 2,160 cGy   Elapsed Days: 17      PTV_Breast   Most recent treatment: Dose given: 200 cGy (on 2023)   Total: Dose given: 2,400 cGy   Elapsed Days: 17         Historical   No historical radiation treatments to show.            Subjective      Treatment tolerance:  The patient is tolerating breast radiotherapy well.  She has no side effects to report from treatment.  Patient reports occasional mild odynophagia which she attributes to allergies.  Mrs. Foster notes some fatigue.    Medications:     Current Outpatient Medications:   •  amoxicillin (AMOXIL) 875 MG tablet, , Disp: , Rfl:   •  anastrozole (ARIMIDEX) 1 MG tablet, Take 1 tablet by mouth Daily., Disp: , Rfl:   •  fluticasone (FLONASE) 50 MCG/ACT nasal spray, SHAKE LIQUID AND USE 1 SPRAY IN EACH NOSTRIL EVERY 12 HOURS FOR NASAL CONGESTION, Disp: , Rfl:   •  metFORMIN (GLUCOPHAGE) 500 MG tablet, 1 tablet 2 (Two) Times a Day With Meals., Disp: , Rfl:   •  multivitamin (THERAGRAN) tablet tablet, Take  by mouth Daily., Disp: , Rfl:   •  naloxone  (NARCAN) 4 MG/0.1ML nasal spray, Use 1 spray into the nostril(s) as directed by provider As Needed (opioid overdose). Call 911. Don't prime. Repeat in 2-3 min if necessary., Disp: 2 each, Rfl: 0  •  ofloxacin (FLOXIN) 0.3 % otic solution, INSTILL 10 DROPS TO AFFECTED EAR EVERY DAY FOR 5 DAYS, Disp: , Rfl:   •  ondansetron (ZOFRAN) 8 MG tablet, , Disp: , Rfl:   •  pantoprazole (PROTONIX) 40 MG EC tablet, TAKE 1 TABLET BY MOUTH TWICE DAILY FOR STOMACH, Disp: , Rfl:   •  propranolol (INDERAL) 40 MG tablet, , Disp: , Rfl:   •  rosuvastatin (CRESTOR) 10 MG tablet, , Disp: , Rfl:   •  Vitamin D, Cholecalciferol, (CHOLECALCIFEROL) 10 MCG (400 UNIT) tablet, Take 1 tablet by mouth Daily., Disp: , Rfl:   •  vitamin E 400 UNIT capsule, Take  by mouth Daily., Disp: , Rfl:     Allergies:   No Known Allergies  Objective     Physical Exam:  The patient is a fit appearing middle-aged white female in no acute distress.  Vital signs as below.  Neck: Short supple no cervical adenopathy or thyromegaly.  Heart: Regular rate and rhythm  Lungs: Clear bilaterally  Breasts: No radiation changes are noted over the left breast.  Surgical scars remain well-healed.  Lymphatics: No detectable cervical, periclavicular or axillary adenopathy    Vital Signs:   Vitals:    05/05/23 0900   BP: 145/81   Pulse: 68   Resp: 18   Temp: 97.1 °F (36.2 °C)   TempSrc: Temporal   SpO2: 98%   Weight: 89.4 kg (197 lb)   PainSc: 0-No pain     Body mass index is 34.9 kg/m².     Current Total XRT Dose (cGY):    Radiation Treatments     Active   Plans   Lt Scv [Lt Brst_FiF]   Most recent treatment: Dose planned: 200 cGy (fraction 12 on 5/4/2023)   Total: Dose planned: 4,600 cGy (23 fractions)   Elapsed Days: 17      Lt Scv_FiF   Most recent treatment: Dose planned: 180 cGy (fraction 12 on 5/4/2023)   Total: Dose planned: 4,500 cGy (25 fractions)   Elapsed Days: 17      Reference Points   Lt SC   Most recent treatment: Dose given: 180 cGy (on 5/4/2023)   Total: Dose  given: 2,160 cGy   Elapsed Days: 17      PTV_Breast   Most recent treatment: Dose given: 200 cGy (on 5/4/2023)   Total: Dose given: 2,400 cGy   Elapsed Days: 17         Historical   No historical radiation treatments to show.            Plan      Plan:   Patient was seen today for an on treatment visit. Patient is receiving radiation therapy to the left breast. Patient is stable and tolerating radiation therapy well with minimal side effects. Continue with radiation therapy.     I have reviewed treatment setup notes, checked and approved the daily guidance images. I reviewed dose delivery, treatment parameters and deemed them appropriate. We plan to continue radiation therapy as prescribed.     Digital speech recognition software was used to dictate parts of this note, some dictation errors may occur.    Hiren Orosco MD   05/05/23 09:08 EDT

## 2023-05-08 ENCOUNTER — HOSPITAL ENCOUNTER (OUTPATIENT)
Dept: RADIATION ONCOLOGY | Facility: HOSPITAL | Age: 63
Discharge: HOME | End: 2023-05-08

## 2023-05-08 LAB
RAD ONC ARIA COURSE ID: NORMAL
RAD ONC ARIA COURSE INTENT: NORMAL
RAD ONC ARIA COURSE LAST TREATMENT DATE: NORMAL
RAD ONC ARIA COURSE START DATE: NORMAL
RAD ONC ARIA COURSE TREATMENT ELAPSED DAYS: 21
RAD ONC ARIA FIRST TREATMENT DATE: NORMAL
RAD ONC ARIA PLAN FRACTIONS TREATED TO DATE: 14
RAD ONC ARIA PLAN FRACTIONS TREATED TO DATE: 14
RAD ONC ARIA PLAN ID: NORMAL
RAD ONC ARIA PLAN ID: NORMAL
RAD ONC ARIA PLAN NAME: NORMAL
RAD ONC ARIA PLAN NAME: NORMAL
RAD ONC ARIA PLAN PRESCRIBED DOSE PER FRACTION: 1.8 GY
RAD ONC ARIA PLAN PRESCRIBED DOSE PER FRACTION: 2 GY
RAD ONC ARIA PLAN PRIMARY REFERENCE POINT: NORMAL
RAD ONC ARIA PLAN PRIMARY REFERENCE POINT: NORMAL
RAD ONC ARIA PLAN TOTAL FRACTIONS PRESCRIBED: 23
RAD ONC ARIA PLAN TOTAL FRACTIONS PRESCRIBED: 25
RAD ONC ARIA PLAN TOTAL PRESCRIBED DOSE: 4500 CGY
RAD ONC ARIA PLAN TOTAL PRESCRIBED DOSE: 4600 CGY
RAD ONC ARIA REFERENCE POINT DOSAGE GIVEN TO DATE: 25.2 GY
RAD ONC ARIA REFERENCE POINT DOSAGE GIVEN TO DATE: 28 GY
RAD ONC ARIA REFERENCE POINT ID: NORMAL
RAD ONC ARIA REFERENCE POINT ID: NORMAL
RAD ONC ARIA REFERENCE POINT SESSION DOSAGE GIVEN: 1.8 GY
RAD ONC ARIA REFERENCE POINT SESSION DOSAGE GIVEN: 2 GY

## 2023-05-08 PROCEDURE — G6002 STEREOSCOPIC X-RAY GUIDANCE: HCPCS | Performed by: RADIOLOGY

## 2023-05-08 PROCEDURE — 77387 GUIDANCE FOR RADJ TX DLVR: CPT | Performed by: RADIOLOGY

## 2023-05-08 PROCEDURE — 77412 RADIATION TX DELIVERY LVL 3: CPT | Performed by: RADIOLOGY

## 2023-05-09 ENCOUNTER — HOSPITAL ENCOUNTER (OUTPATIENT)
Dept: RADIATION ONCOLOGY | Facility: HOSPITAL | Age: 63
Setting detail: RADIATION/ONCOLOGY SERIES
Discharge: HOME | End: 2023-05-09
Payer: COMMERCIAL

## 2023-05-09 ENCOUNTER — HOSPITAL ENCOUNTER (OUTPATIENT)
Dept: RADIATION ONCOLOGY | Facility: HOSPITAL | Age: 63
Discharge: HOME | End: 2023-05-09

## 2023-05-09 VITALS
HEART RATE: 73 BPM | DIASTOLIC BLOOD PRESSURE: 79 MMHG | SYSTOLIC BLOOD PRESSURE: 129 MMHG | RESPIRATION RATE: 18 BRPM | WEIGHT: 196.4 LBS | BODY MASS INDEX: 34.79 KG/M2 | TEMPERATURE: 97.5 F | OXYGEN SATURATION: 97 %

## 2023-05-09 DIAGNOSIS — Z17.0 MALIGNANT NEOPLASM OF UPPER-OUTER QUADRANT OF LEFT BREAST IN FEMALE, ESTROGEN RECEPTOR POSITIVE: Primary | ICD-10-CM

## 2023-05-09 DIAGNOSIS — C50.412 MALIGNANT NEOPLASM OF UPPER-OUTER QUADRANT OF LEFT BREAST IN FEMALE, ESTROGEN RECEPTOR POSITIVE: Primary | ICD-10-CM

## 2023-05-09 LAB
RAD ONC ARIA COURSE ID: NORMAL
RAD ONC ARIA COURSE INTENT: NORMAL
RAD ONC ARIA COURSE LAST TREATMENT DATE: NORMAL
RAD ONC ARIA COURSE START DATE: NORMAL
RAD ONC ARIA COURSE TREATMENT ELAPSED DAYS: 22
RAD ONC ARIA FIRST TREATMENT DATE: NORMAL
RAD ONC ARIA PLAN FRACTIONS TREATED TO DATE: 15
RAD ONC ARIA PLAN FRACTIONS TREATED TO DATE: 15
RAD ONC ARIA PLAN ID: NORMAL
RAD ONC ARIA PLAN ID: NORMAL
RAD ONC ARIA PLAN NAME: NORMAL
RAD ONC ARIA PLAN NAME: NORMAL
RAD ONC ARIA PLAN PRESCRIBED DOSE PER FRACTION: 1.8 GY
RAD ONC ARIA PLAN PRESCRIBED DOSE PER FRACTION: 2 GY
RAD ONC ARIA PLAN PRIMARY REFERENCE POINT: NORMAL
RAD ONC ARIA PLAN PRIMARY REFERENCE POINT: NORMAL
RAD ONC ARIA PLAN TOTAL FRACTIONS PRESCRIBED: 23
RAD ONC ARIA PLAN TOTAL FRACTIONS PRESCRIBED: 25
RAD ONC ARIA PLAN TOTAL PRESCRIBED DOSE: 4500 CGY
RAD ONC ARIA PLAN TOTAL PRESCRIBED DOSE: 4600 CGY
RAD ONC ARIA REFERENCE POINT DOSAGE GIVEN TO DATE: 27 GY
RAD ONC ARIA REFERENCE POINT DOSAGE GIVEN TO DATE: 30 GY
RAD ONC ARIA REFERENCE POINT ID: NORMAL
RAD ONC ARIA REFERENCE POINT ID: NORMAL
RAD ONC ARIA REFERENCE POINT SESSION DOSAGE GIVEN: 1.8 GY
RAD ONC ARIA REFERENCE POINT SESSION DOSAGE GIVEN: 2 GY

## 2023-05-09 PROCEDURE — G6002 STEREOSCOPIC X-RAY GUIDANCE: HCPCS | Performed by: RADIOLOGY

## 2023-05-09 PROCEDURE — 77412 RADIATION TX DELIVERY LVL 3: CPT | Performed by: RADIOLOGY

## 2023-05-09 PROCEDURE — 77387 GUIDANCE FOR RADJ TX DLVR: CPT | Performed by: RADIOLOGY

## 2023-05-09 NOTE — PROGRESS NOTES
On Treatment Visit       Patient: Terri Foster   YOB: 1960   Medical Record Number: [id number]     Date of Visit  May 9, 2023   Primary Diagnosis:  Cancer Staging   No matching staging information was found for the patient.  Malignant neoplasm of upper-outer quadrant of left breast in female, estrogen receptor positive [C50.412, Z17.0]     was seen today for an on treatment visit.  She is receiving radiation therapy to the right breast and supraclavicular fossa. She has received 15 of 23 planned fractions to the entire left breast.  That totals 3000 cGy of a planned 4600 cGy.  A boost to the left breast tumor bed is scheduled later.  To date she has received 15 of 25 fractions to the left supraclavicular fossa.  Dose there to date is 2700 of 4500 cGy planned.     Today on exam the patient is tolerating radiation therapy well and has no new disease or treatment-related complaints.           Vitals:    05/09/23 0915   BP: 129/79   Pulse: 73   Resp: 18   Temp: 97.5 °F (36.4 °C)   SpO2: 97%           Plan: We plan to continue radiation therapy as prescribed.        Electronically signed by Immanuel Bay MD 5/9/2023  09:18 EDT

## 2023-05-10 ENCOUNTER — HOSPITAL ENCOUNTER (OUTPATIENT)
Dept: RADIATION ONCOLOGY | Facility: HOSPITAL | Age: 63
Discharge: HOME | End: 2023-05-10

## 2023-05-10 LAB
RAD ONC ARIA COURSE ID: NORMAL
RAD ONC ARIA COURSE INTENT: NORMAL
RAD ONC ARIA COURSE LAST TREATMENT DATE: NORMAL
RAD ONC ARIA COURSE START DATE: NORMAL
RAD ONC ARIA COURSE TREATMENT ELAPSED DAYS: 23
RAD ONC ARIA FIRST TREATMENT DATE: NORMAL
RAD ONC ARIA PLAN FRACTIONS TREATED TO DATE: 16
RAD ONC ARIA PLAN FRACTIONS TREATED TO DATE: 16
RAD ONC ARIA PLAN ID: NORMAL
RAD ONC ARIA PLAN ID: NORMAL
RAD ONC ARIA PLAN NAME: NORMAL
RAD ONC ARIA PLAN NAME: NORMAL
RAD ONC ARIA PLAN PRESCRIBED DOSE PER FRACTION: 1.8 GY
RAD ONC ARIA PLAN PRESCRIBED DOSE PER FRACTION: 2 GY
RAD ONC ARIA PLAN PRIMARY REFERENCE POINT: NORMAL
RAD ONC ARIA PLAN PRIMARY REFERENCE POINT: NORMAL
RAD ONC ARIA PLAN TOTAL FRACTIONS PRESCRIBED: 23
RAD ONC ARIA PLAN TOTAL FRACTIONS PRESCRIBED: 25
RAD ONC ARIA PLAN TOTAL PRESCRIBED DOSE: 4500 CGY
RAD ONC ARIA PLAN TOTAL PRESCRIBED DOSE: 4600 CGY
RAD ONC ARIA REFERENCE POINT DOSAGE GIVEN TO DATE: 28.8 GY
RAD ONC ARIA REFERENCE POINT DOSAGE GIVEN TO DATE: 32 GY
RAD ONC ARIA REFERENCE POINT ID: NORMAL
RAD ONC ARIA REFERENCE POINT ID: NORMAL
RAD ONC ARIA REFERENCE POINT SESSION DOSAGE GIVEN: 1.8 GY
RAD ONC ARIA REFERENCE POINT SESSION DOSAGE GIVEN: 2 GY

## 2023-05-10 PROCEDURE — 77427 RADIATION TX MANAGEMENT X5: CPT | Performed by: RADIOLOGY

## 2023-05-10 PROCEDURE — 77387 GUIDANCE FOR RADJ TX DLVR: CPT | Performed by: RADIOLOGY

## 2023-05-10 PROCEDURE — 77412 RADIATION TX DELIVERY LVL 3: CPT | Performed by: RADIOLOGY

## 2023-05-10 PROCEDURE — 77336 RADIATION PHYSICS CONSULT: CPT | Performed by: RADIOLOGY

## 2023-05-11 ENCOUNTER — HOSPITAL ENCOUNTER (OUTPATIENT)
Dept: RADIATION ONCOLOGY | Facility: HOSPITAL | Age: 63
Discharge: HOME | End: 2023-05-11

## 2023-05-11 LAB
RAD ONC ARIA COURSE ID: NORMAL
RAD ONC ARIA COURSE INTENT: NORMAL
RAD ONC ARIA COURSE LAST TREATMENT DATE: NORMAL
RAD ONC ARIA COURSE START DATE: NORMAL
RAD ONC ARIA COURSE TREATMENT ELAPSED DAYS: 24
RAD ONC ARIA FIRST TREATMENT DATE: NORMAL
RAD ONC ARIA PLAN FRACTIONS TREATED TO DATE: 17
RAD ONC ARIA PLAN FRACTIONS TREATED TO DATE: 17
RAD ONC ARIA PLAN ID: NORMAL
RAD ONC ARIA PLAN ID: NORMAL
RAD ONC ARIA PLAN NAME: NORMAL
RAD ONC ARIA PLAN NAME: NORMAL
RAD ONC ARIA PLAN PRESCRIBED DOSE PER FRACTION: 1.8 GY
RAD ONC ARIA PLAN PRESCRIBED DOSE PER FRACTION: 2 GY
RAD ONC ARIA PLAN PRIMARY REFERENCE POINT: NORMAL
RAD ONC ARIA PLAN PRIMARY REFERENCE POINT: NORMAL
RAD ONC ARIA PLAN TOTAL FRACTIONS PRESCRIBED: 23
RAD ONC ARIA PLAN TOTAL FRACTIONS PRESCRIBED: 25
RAD ONC ARIA PLAN TOTAL PRESCRIBED DOSE: 4500 CGY
RAD ONC ARIA PLAN TOTAL PRESCRIBED DOSE: 4600 CGY
RAD ONC ARIA REFERENCE POINT DOSAGE GIVEN TO DATE: 30.6 GY
RAD ONC ARIA REFERENCE POINT DOSAGE GIVEN TO DATE: 34 GY
RAD ONC ARIA REFERENCE POINT ID: NORMAL
RAD ONC ARIA REFERENCE POINT ID: NORMAL
RAD ONC ARIA REFERENCE POINT SESSION DOSAGE GIVEN: 1.8 GY
RAD ONC ARIA REFERENCE POINT SESSION DOSAGE GIVEN: 2 GY

## 2023-05-11 PROCEDURE — 77387 GUIDANCE FOR RADJ TX DLVR: CPT | Performed by: RADIOLOGY

## 2023-05-11 PROCEDURE — 77412 RADIATION TX DELIVERY LVL 3: CPT | Performed by: RADIOLOGY

## 2023-05-12 ENCOUNTER — HOSPITAL ENCOUNTER (OUTPATIENT)
Dept: RADIATION ONCOLOGY | Facility: HOSPITAL | Age: 63
Discharge: HOME | End: 2023-05-12

## 2023-05-12 LAB
RAD ONC ARIA COURSE ID: NORMAL
RAD ONC ARIA COURSE INTENT: NORMAL
RAD ONC ARIA COURSE LAST TREATMENT DATE: NORMAL
RAD ONC ARIA COURSE START DATE: NORMAL
RAD ONC ARIA COURSE TREATMENT ELAPSED DAYS: 25
RAD ONC ARIA FIRST TREATMENT DATE: NORMAL
RAD ONC ARIA PLAN FRACTIONS TREATED TO DATE: 18
RAD ONC ARIA PLAN FRACTIONS TREATED TO DATE: 18
RAD ONC ARIA PLAN ID: NORMAL
RAD ONC ARIA PLAN ID: NORMAL
RAD ONC ARIA PLAN NAME: NORMAL
RAD ONC ARIA PLAN NAME: NORMAL
RAD ONC ARIA PLAN PRESCRIBED DOSE PER FRACTION: 1.8 GY
RAD ONC ARIA PLAN PRESCRIBED DOSE PER FRACTION: 2 GY
RAD ONC ARIA PLAN PRIMARY REFERENCE POINT: NORMAL
RAD ONC ARIA PLAN PRIMARY REFERENCE POINT: NORMAL
RAD ONC ARIA PLAN TOTAL FRACTIONS PRESCRIBED: 23
RAD ONC ARIA PLAN TOTAL FRACTIONS PRESCRIBED: 25
RAD ONC ARIA PLAN TOTAL PRESCRIBED DOSE: 4500 CGY
RAD ONC ARIA PLAN TOTAL PRESCRIBED DOSE: 4600 CGY
RAD ONC ARIA REFERENCE POINT DOSAGE GIVEN TO DATE: 32.4 GY
RAD ONC ARIA REFERENCE POINT DOSAGE GIVEN TO DATE: 36 GY
RAD ONC ARIA REFERENCE POINT ID: NORMAL
RAD ONC ARIA REFERENCE POINT ID: NORMAL
RAD ONC ARIA REFERENCE POINT SESSION DOSAGE GIVEN: 1.8 GY
RAD ONC ARIA REFERENCE POINT SESSION DOSAGE GIVEN: 2 GY

## 2023-05-12 PROCEDURE — 77387 GUIDANCE FOR RADJ TX DLVR: CPT | Performed by: RADIOLOGY

## 2023-05-12 PROCEDURE — 77412 RADIATION TX DELIVERY LVL 3: CPT | Performed by: RADIOLOGY

## 2023-05-15 ENCOUNTER — DOCUMENTATION (OUTPATIENT)
Dept: ONCOLOGY | Facility: HOSPITAL | Age: 63
End: 2023-05-15
Payer: COMMERCIAL

## 2023-05-15 ENCOUNTER — HOSPITAL ENCOUNTER (OUTPATIENT)
Dept: RADIATION ONCOLOGY | Facility: HOSPITAL | Age: 63
Discharge: HOME | End: 2023-05-15

## 2023-05-15 LAB
RAD ONC ARIA COURSE ID: NORMAL
RAD ONC ARIA COURSE INTENT: NORMAL
RAD ONC ARIA COURSE LAST TREATMENT DATE: NORMAL
RAD ONC ARIA COURSE START DATE: NORMAL
RAD ONC ARIA COURSE TREATMENT ELAPSED DAYS: 28
RAD ONC ARIA FIRST TREATMENT DATE: NORMAL
RAD ONC ARIA PLAN FRACTIONS TREATED TO DATE: 19
RAD ONC ARIA PLAN FRACTIONS TREATED TO DATE: 19
RAD ONC ARIA PLAN ID: NORMAL
RAD ONC ARIA PLAN ID: NORMAL
RAD ONC ARIA PLAN NAME: NORMAL
RAD ONC ARIA PLAN NAME: NORMAL
RAD ONC ARIA PLAN PRESCRIBED DOSE PER FRACTION: 1.8 GY
RAD ONC ARIA PLAN PRESCRIBED DOSE PER FRACTION: 2 GY
RAD ONC ARIA PLAN PRIMARY REFERENCE POINT: NORMAL
RAD ONC ARIA PLAN PRIMARY REFERENCE POINT: NORMAL
RAD ONC ARIA PLAN TOTAL FRACTIONS PRESCRIBED: 23
RAD ONC ARIA PLAN TOTAL FRACTIONS PRESCRIBED: 25
RAD ONC ARIA PLAN TOTAL PRESCRIBED DOSE: 4500 CGY
RAD ONC ARIA PLAN TOTAL PRESCRIBED DOSE: 4600 CGY
RAD ONC ARIA REFERENCE POINT DOSAGE GIVEN TO DATE: 34.2 GY
RAD ONC ARIA REFERENCE POINT DOSAGE GIVEN TO DATE: 38 GY
RAD ONC ARIA REFERENCE POINT ID: NORMAL
RAD ONC ARIA REFERENCE POINT ID: NORMAL
RAD ONC ARIA REFERENCE POINT SESSION DOSAGE GIVEN: 1.8 GY
RAD ONC ARIA REFERENCE POINT SESSION DOSAGE GIVEN: 2 GY

## 2023-05-15 PROCEDURE — 77412 RADIATION TX DELIVERY LVL 3: CPT | Performed by: RADIOLOGY

## 2023-05-15 PROCEDURE — G6002 STEREOSCOPIC X-RAY GUIDANCE: HCPCS | Performed by: RADIOLOGY

## 2023-05-15 PROCEDURE — 77387 GUIDANCE FOR RADJ TX DLVR: CPT | Performed by: RADIOLOGY

## 2023-05-15 NOTE — PROGRESS NOTES
SS received e-mail per Yuli G. Butlr stating that patient's application has been approved and being processed.

## 2023-05-16 ENCOUNTER — HOSPITAL ENCOUNTER (OUTPATIENT)
Dept: RADIATION ONCOLOGY | Facility: HOSPITAL | Age: 63
Discharge: HOME | End: 2023-05-16

## 2023-05-16 VITALS
RESPIRATION RATE: 18 BRPM | DIASTOLIC BLOOD PRESSURE: 81 MMHG | OXYGEN SATURATION: 96 % | HEART RATE: 67 BPM | SYSTOLIC BLOOD PRESSURE: 134 MMHG | WEIGHT: 197 LBS | BODY MASS INDEX: 34.9 KG/M2 | TEMPERATURE: 96.9 F

## 2023-05-16 DIAGNOSIS — C50.412 MALIGNANT NEOPLASM OF UPPER-OUTER QUADRANT OF LEFT BREAST IN FEMALE, ESTROGEN RECEPTOR POSITIVE: Primary | ICD-10-CM

## 2023-05-16 DIAGNOSIS — Z17.0 MALIGNANT NEOPLASM OF UPPER-OUTER QUADRANT OF LEFT BREAST IN FEMALE, ESTROGEN RECEPTOR POSITIVE: Primary | ICD-10-CM

## 2023-05-16 LAB
RAD ONC ARIA COURSE ID: NORMAL
RAD ONC ARIA COURSE INTENT: NORMAL
RAD ONC ARIA COURSE LAST TREATMENT DATE: NORMAL
RAD ONC ARIA COURSE START DATE: NORMAL
RAD ONC ARIA COURSE TREATMENT ELAPSED DAYS: 29
RAD ONC ARIA FIRST TREATMENT DATE: NORMAL
RAD ONC ARIA PLAN FRACTIONS TREATED TO DATE: 20
RAD ONC ARIA PLAN FRACTIONS TREATED TO DATE: 20
RAD ONC ARIA PLAN ID: NORMAL
RAD ONC ARIA PLAN ID: NORMAL
RAD ONC ARIA PLAN NAME: NORMAL
RAD ONC ARIA PLAN NAME: NORMAL
RAD ONC ARIA PLAN PRESCRIBED DOSE PER FRACTION: 1.8 GY
RAD ONC ARIA PLAN PRESCRIBED DOSE PER FRACTION: 2 GY
RAD ONC ARIA PLAN PRIMARY REFERENCE POINT: NORMAL
RAD ONC ARIA PLAN PRIMARY REFERENCE POINT: NORMAL
RAD ONC ARIA PLAN TOTAL FRACTIONS PRESCRIBED: 23
RAD ONC ARIA PLAN TOTAL FRACTIONS PRESCRIBED: 25
RAD ONC ARIA PLAN TOTAL PRESCRIBED DOSE: 4500 CGY
RAD ONC ARIA PLAN TOTAL PRESCRIBED DOSE: 4600 CGY
RAD ONC ARIA REFERENCE POINT DOSAGE GIVEN TO DATE: 36 GY
RAD ONC ARIA REFERENCE POINT DOSAGE GIVEN TO DATE: 40 GY
RAD ONC ARIA REFERENCE POINT ID: NORMAL
RAD ONC ARIA REFERENCE POINT ID: NORMAL
RAD ONC ARIA REFERENCE POINT SESSION DOSAGE GIVEN: 1.8 GY
RAD ONC ARIA REFERENCE POINT SESSION DOSAGE GIVEN: 2 GY

## 2023-05-16 PROCEDURE — 77387 GUIDANCE FOR RADJ TX DLVR: CPT | Performed by: RADIOLOGY

## 2023-05-16 PROCEDURE — 77412 RADIATION TX DELIVERY LVL 3: CPT | Performed by: RADIOLOGY

## 2023-05-16 PROCEDURE — G6002 STEREOSCOPIC X-RAY GUIDANCE: HCPCS | Performed by: RADIOLOGY

## 2023-05-16 NOTE — PROGRESS NOTES
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     Radiation Treatments     Active   Plans   Lt Scv [Lt Brst_FiF]   Most recent treatment: Dose planned: 200 cGy (fraction 20 on 2023)   Total: Dose planned: 4,600 cGy (23 fractions)   Elapsed Days: 29      Lt Scv_FiF   Most recent treatment: Dose planned: 180 cGy (fraction 20 on 2023)   Total: Dose planned: 4,500 cGy (25 fractions)   Elapsed Days: 29      Reference Points   Lt SC   Most recent treatment: Dose given: 180 cGy (on 2023)   Total: Dose given: 3,600 cGy   Elapsed Days: 29      PTV_Breast   Most recent treatment: Dose given: 200 cGy (on 2023)   Total: Dose given: 4,000 cGy   Elapsed Days: 29         Historical   No historical radiation treatments to show.            Subjective      Treatment tolerance:  Mrs. Foster is tolerating breast radiotherapy well.  Her chief complaint is fatigue.  Patient notes some skin hyperpigmentation/mild erythema in the axillary and inframammary fold regions.  The patient denies chest wall discomfort and arm swelling.    Medications:     Current Outpatient Medications:   •  amoxicillin (AMOXIL) 875 MG tablet, , Disp: , Rfl:   •  anastrozole (ARIMIDEX) 1 MG tablet, Take 1 tablet by mouth Daily., Disp: , Rfl:   •  fluticasone (FLONASE) 50 MCG/ACT nasal spray, SHAKE LIQUID AND USE 1 SPRAY IN EACH NOSTRIL EVERY 12 HOURS FOR NASAL CONGESTION, Disp: , Rfl:   •  metFORMIN (GLUCOPHAGE) 500 MG tablet, 1 tablet 2 (Two) Times a Day With Meals., Disp: , Rfl:   •  multivitamin (THERAGRAN) tablet tablet, Take  by  mouth Daily., Disp: , Rfl:   •  naloxone (NARCAN) 4 MG/0.1ML nasal spray, Use 1 spray into the nostril(s) as directed by provider As Needed (opioid overdose). Call 911. Don't prime. Repeat in 2-3 min if necessary., Disp: 2 each, Rfl: 0  •  ofloxacin (FLOXIN) 0.3 % otic solution, INSTILL 10 DROPS TO AFFECTED EAR EVERY DAY FOR 5 DAYS, Disp: , Rfl:   •  ondansetron (ZOFRAN) 8 MG tablet, , Disp: , Rfl:   •  pantoprazole (PROTONIX) 40 MG EC tablet, TAKE 1 TABLET BY MOUTH TWICE DAILY FOR STOMACH, Disp: , Rfl:   •  propranolol (INDERAL) 40 MG tablet, , Disp: , Rfl:   •  rosuvastatin (CRESTOR) 10 MG tablet, , Disp: , Rfl:   •  Vitamin D, Cholecalciferol, (CHOLECALCIFEROL) 10 MCG (400 UNIT) tablet, Take 1 tablet by mouth Daily., Disp: , Rfl:   •  vitamin E 400 UNIT capsule, Take  by mouth Daily., Disp: , Rfl:     Allergies:   No Known Allergies  Objective     Physical Exam (limited to areas of interest):  Patient is a fit appearing middle-aged lady in no acute distress.  Neck: Short, supple no detectable cervical or periclavicular adenopathy  Heart: Regular rate and rhythm  Lungs: Clear to auscultation bilaterally  Breasts: Surgical scars in the left breast are well-healed.  Mild erythema is noted in the left axillary and inframammary fold regions.  No skin breakdown present.  Lymphatics: No detectable cervical, periclavicular or axillary adenopathy  Extremities: No left arm swelling or limitation of range of motion    Vital Signs:   Vitals:    05/16/23 0916   BP: 134/81   Pulse: 67   Resp: 18   Temp: 96.9 °F (36.1 °C)   TempSrc: Temporal   SpO2: 96%   Weight: 89.4 kg (197 lb)   PainSc: 0-No pain     Body mass index is 34.9 kg/m².     Current Total XRT Dose (cGY):    Radiation Treatments     Active   Plans   Lt Scv [Lt Brst_FiF]   Most recent treatment: Dose planned: 200 cGy (fraction 20 on 5/16/2023)   Total: Dose planned: 4,600 cGy (23 fractions)   Elapsed Days: 29      Lt Scv_FiF   Most recent treatment: Dose planned: 180  cGy (fraction 20 on 5/16/2023)   Total: Dose planned: 4,500 cGy (25 fractions)   Elapsed Days: 29      Reference Points   Lt SC   Most recent treatment: Dose given: 180 cGy (on 5/16/2023)   Total: Dose given: 3,600 cGy   Elapsed Days: 29      PTV_Breast   Most recent treatment: Dose given: 200 cGy (on 5/16/2023)   Total: Dose given: 4,000 cGy   Elapsed Days: 29         Historical   No historical radiation treatments to show.            Plan      Plan:   Patient was seen today for an on treatment visit. Patient is receiving radiation therapy to the left breast.. Patient is stable and tolerating radiation therapy well with minimal side effects. Continue with radiation therapy.  The patient states that she saw Dr. Carpenter, her medical oncologist at [place] last week.  The patient states that her Oncotype score was 19-indicative of no benefit from chemotherapy.  The patient remains on anastrozole.  I discussed post radiotherapy surveillance measures with Mrs. Foster.    I have reviewed treatment setup notes, checked and approved the daily guidance images. I reviewed dose delivery, treatment parameters and deemed them appropriate. We plan to continue radiation therapy as prescribed.     Digital speech recognition software was used to dictate parts of this note, some dictation errors may occur.    Hiren Orosco MD   05/16/23 09:21 EDT

## 2023-05-17 ENCOUNTER — HOSPITAL ENCOUNTER (OUTPATIENT)
Dept: RADIATION ONCOLOGY | Facility: HOSPITAL | Age: 63
Discharge: HOME | End: 2023-05-17

## 2023-05-17 LAB
RAD ONC ARIA COURSE ID: NORMAL
RAD ONC ARIA COURSE INTENT: NORMAL
RAD ONC ARIA COURSE LAST TREATMENT DATE: NORMAL
RAD ONC ARIA COURSE START DATE: NORMAL
RAD ONC ARIA COURSE TREATMENT ELAPSED DAYS: 30
RAD ONC ARIA FIRST TREATMENT DATE: NORMAL
RAD ONC ARIA PLAN FRACTIONS TREATED TO DATE: 21
RAD ONC ARIA PLAN FRACTIONS TREATED TO DATE: 21
RAD ONC ARIA PLAN ID: NORMAL
RAD ONC ARIA PLAN ID: NORMAL
RAD ONC ARIA PLAN NAME: NORMAL
RAD ONC ARIA PLAN NAME: NORMAL
RAD ONC ARIA PLAN PRESCRIBED DOSE PER FRACTION: 1.8 GY
RAD ONC ARIA PLAN PRESCRIBED DOSE PER FRACTION: 2 GY
RAD ONC ARIA PLAN PRIMARY REFERENCE POINT: NORMAL
RAD ONC ARIA PLAN PRIMARY REFERENCE POINT: NORMAL
RAD ONC ARIA PLAN TOTAL FRACTIONS PRESCRIBED: 23
RAD ONC ARIA PLAN TOTAL FRACTIONS PRESCRIBED: 25
RAD ONC ARIA PLAN TOTAL PRESCRIBED DOSE: 4500 CGY
RAD ONC ARIA PLAN TOTAL PRESCRIBED DOSE: 4600 CGY
RAD ONC ARIA REFERENCE POINT DOSAGE GIVEN TO DATE: 37.8 GY
RAD ONC ARIA REFERENCE POINT DOSAGE GIVEN TO DATE: 42 GY
RAD ONC ARIA REFERENCE POINT ID: NORMAL
RAD ONC ARIA REFERENCE POINT ID: NORMAL
RAD ONC ARIA REFERENCE POINT SESSION DOSAGE GIVEN: 1.8 GY
RAD ONC ARIA REFERENCE POINT SESSION DOSAGE GIVEN: 2 GY

## 2023-05-17 PROCEDURE — 77427 RADIATION TX MANAGEMENT X5: CPT | Performed by: RADIOLOGY

## 2023-05-17 PROCEDURE — G6002 STEREOSCOPIC X-RAY GUIDANCE: HCPCS | Performed by: RADIOLOGY

## 2023-05-17 PROCEDURE — 77336 RADIATION PHYSICS CONSULT: CPT | Performed by: RADIOLOGY

## 2023-05-17 PROCEDURE — 77387 GUIDANCE FOR RADJ TX DLVR: CPT | Performed by: RADIOLOGY

## 2023-05-17 PROCEDURE — 77412 RADIATION TX DELIVERY LVL 3: CPT | Performed by: RADIOLOGY

## 2023-05-18 ENCOUNTER — HOSPITAL ENCOUNTER (OUTPATIENT)
Dept: RADIATION ONCOLOGY | Facility: HOSPITAL | Age: 63
Discharge: HOME | End: 2023-05-18

## 2023-05-18 LAB
RAD ONC ARIA COURSE ID: NORMAL
RAD ONC ARIA COURSE INTENT: NORMAL
RAD ONC ARIA COURSE LAST TREATMENT DATE: NORMAL
RAD ONC ARIA COURSE START DATE: NORMAL
RAD ONC ARIA COURSE TREATMENT ELAPSED DAYS: 31
RAD ONC ARIA FIRST TREATMENT DATE: NORMAL
RAD ONC ARIA PLAN FRACTIONS TREATED TO DATE: 22
RAD ONC ARIA PLAN FRACTIONS TREATED TO DATE: 22
RAD ONC ARIA PLAN ID: NORMAL
RAD ONC ARIA PLAN ID: NORMAL
RAD ONC ARIA PLAN NAME: NORMAL
RAD ONC ARIA PLAN NAME: NORMAL
RAD ONC ARIA PLAN PRESCRIBED DOSE PER FRACTION: 1.8 GY
RAD ONC ARIA PLAN PRESCRIBED DOSE PER FRACTION: 2 GY
RAD ONC ARIA PLAN PRIMARY REFERENCE POINT: NORMAL
RAD ONC ARIA PLAN PRIMARY REFERENCE POINT: NORMAL
RAD ONC ARIA PLAN TOTAL FRACTIONS PRESCRIBED: 23
RAD ONC ARIA PLAN TOTAL FRACTIONS PRESCRIBED: 25
RAD ONC ARIA PLAN TOTAL PRESCRIBED DOSE: 4500 CGY
RAD ONC ARIA PLAN TOTAL PRESCRIBED DOSE: 4600 CGY
RAD ONC ARIA REFERENCE POINT DOSAGE GIVEN TO DATE: 39.6 GY
RAD ONC ARIA REFERENCE POINT DOSAGE GIVEN TO DATE: 44 GY
RAD ONC ARIA REFERENCE POINT ID: NORMAL
RAD ONC ARIA REFERENCE POINT ID: NORMAL
RAD ONC ARIA REFERENCE POINT SESSION DOSAGE GIVEN: 1.8 GY
RAD ONC ARIA REFERENCE POINT SESSION DOSAGE GIVEN: 2 GY

## 2023-05-18 PROCEDURE — 77412 RADIATION TX DELIVERY LVL 3: CPT | Performed by: RADIOLOGY

## 2023-05-18 PROCEDURE — 77387 GUIDANCE FOR RADJ TX DLVR: CPT | Performed by: RADIOLOGY

## 2023-05-18 PROCEDURE — G6002 STEREOSCOPIC X-RAY GUIDANCE: HCPCS | Performed by: RADIOLOGY

## 2023-05-19 ENCOUNTER — HOSPITAL ENCOUNTER (OUTPATIENT)
Dept: RADIATION ONCOLOGY | Facility: HOSPITAL | Age: 63
Discharge: HOME | End: 2023-05-19

## 2023-05-19 LAB
RAD ONC ARIA COURSE ID: NORMAL
RAD ONC ARIA COURSE INTENT: NORMAL
RAD ONC ARIA COURSE LAST TREATMENT DATE: NORMAL
RAD ONC ARIA COURSE START DATE: NORMAL
RAD ONC ARIA COURSE TREATMENT ELAPSED DAYS: 32
RAD ONC ARIA FIRST TREATMENT DATE: NORMAL
RAD ONC ARIA PLAN FRACTIONS TREATED TO DATE: 23
RAD ONC ARIA PLAN FRACTIONS TREATED TO DATE: 23
RAD ONC ARIA PLAN ID: NORMAL
RAD ONC ARIA PLAN ID: NORMAL
RAD ONC ARIA PLAN NAME: NORMAL
RAD ONC ARIA PLAN NAME: NORMAL
RAD ONC ARIA PLAN PRESCRIBED DOSE PER FRACTION: 1.8 GY
RAD ONC ARIA PLAN PRESCRIBED DOSE PER FRACTION: 2 GY
RAD ONC ARIA PLAN PRIMARY REFERENCE POINT: NORMAL
RAD ONC ARIA PLAN PRIMARY REFERENCE POINT: NORMAL
RAD ONC ARIA PLAN TOTAL FRACTIONS PRESCRIBED: 23
RAD ONC ARIA PLAN TOTAL FRACTIONS PRESCRIBED: 25
RAD ONC ARIA PLAN TOTAL PRESCRIBED DOSE: 4500 CGY
RAD ONC ARIA PLAN TOTAL PRESCRIBED DOSE: 4600 CGY
RAD ONC ARIA REFERENCE POINT DOSAGE GIVEN TO DATE: 41.4 GY
RAD ONC ARIA REFERENCE POINT DOSAGE GIVEN TO DATE: 46 GY
RAD ONC ARIA REFERENCE POINT ID: NORMAL
RAD ONC ARIA REFERENCE POINT ID: NORMAL
RAD ONC ARIA REFERENCE POINT SESSION DOSAGE GIVEN: 1.8 GY
RAD ONC ARIA REFERENCE POINT SESSION DOSAGE GIVEN: 2 GY

## 2023-05-19 PROCEDURE — 77412 RADIATION TX DELIVERY LVL 3: CPT | Performed by: RADIOLOGY

## 2023-05-19 PROCEDURE — 77387 GUIDANCE FOR RADJ TX DLVR: CPT | Performed by: RADIOLOGY

## 2023-05-19 PROCEDURE — G6002 STEREOSCOPIC X-RAY GUIDANCE: HCPCS | Performed by: RADIOLOGY

## 2023-05-22 ENCOUNTER — HOSPITAL ENCOUNTER (OUTPATIENT)
Dept: RADIATION ONCOLOGY | Facility: HOSPITAL | Age: 63
Discharge: HOME | End: 2023-05-22

## 2023-05-22 LAB
RAD ONC ARIA COURSE ID: NORMAL
RAD ONC ARIA COURSE INTENT: NORMAL
RAD ONC ARIA COURSE LAST TREATMENT DATE: NORMAL
RAD ONC ARIA COURSE START DATE: NORMAL
RAD ONC ARIA COURSE TREATMENT ELAPSED DAYS: 35
RAD ONC ARIA FIRST TREATMENT DATE: NORMAL
RAD ONC ARIA PLAN FRACTIONS TREATED TO DATE: 1
RAD ONC ARIA PLAN FRACTIONS TREATED TO DATE: 24
RAD ONC ARIA PLAN ID: NORMAL
RAD ONC ARIA PLAN ID: NORMAL
RAD ONC ARIA PLAN NAME: NORMAL
RAD ONC ARIA PLAN NAME: NORMAL
RAD ONC ARIA PLAN PRESCRIBED DOSE PER FRACTION: 1.8 GY
RAD ONC ARIA PLAN PRESCRIBED DOSE PER FRACTION: 2 GY
RAD ONC ARIA PLAN PRIMARY REFERENCE POINT: NORMAL
RAD ONC ARIA PLAN PRIMARY REFERENCE POINT: NORMAL
RAD ONC ARIA PLAN TOTAL FRACTIONS PRESCRIBED: 25
RAD ONC ARIA PLAN TOTAL FRACTIONS PRESCRIBED: 7
RAD ONC ARIA PLAN TOTAL PRESCRIBED DOSE: 1400 CGY
RAD ONC ARIA PLAN TOTAL PRESCRIBED DOSE: 4500 CGY
RAD ONC ARIA REFERENCE POINT DOSAGE GIVEN TO DATE: 2 GY
RAD ONC ARIA REFERENCE POINT DOSAGE GIVEN TO DATE: 43.2 GY
RAD ONC ARIA REFERENCE POINT ID: NORMAL
RAD ONC ARIA REFERENCE POINT ID: NORMAL
RAD ONC ARIA REFERENCE POINT SESSION DOSAGE GIVEN: 1.8 GY
RAD ONC ARIA REFERENCE POINT SESSION DOSAGE GIVEN: 2 GY

## 2023-05-22 PROCEDURE — 77280 THER RAD SIMULAJ FIELD SMPL: CPT | Performed by: RADIOLOGY

## 2023-05-22 PROCEDURE — 77387 GUIDANCE FOR RADJ TX DLVR: CPT | Performed by: RADIOLOGY

## 2023-05-22 PROCEDURE — 77412 RADIATION TX DELIVERY LVL 3: CPT | Performed by: RADIOLOGY

## 2023-05-22 PROCEDURE — G6002 STEREOSCOPIC X-RAY GUIDANCE: HCPCS | Performed by: RADIOLOGY

## 2023-05-23 ENCOUNTER — HOSPITAL ENCOUNTER (OUTPATIENT)
Dept: RADIATION ONCOLOGY | Facility: HOSPITAL | Age: 63
Discharge: HOME | End: 2023-05-23

## 2023-05-23 ENCOUNTER — HOSPITAL ENCOUNTER (OUTPATIENT)
Dept: RADIATION ONCOLOGY | Facility: HOSPITAL | Age: 63
Setting detail: RADIATION/ONCOLOGY SERIES
Discharge: HOME | End: 2023-05-23
Payer: COMMERCIAL

## 2023-05-23 VITALS
SYSTOLIC BLOOD PRESSURE: 135 MMHG | RESPIRATION RATE: 18 BRPM | OXYGEN SATURATION: 98 % | TEMPERATURE: 97.9 F | WEIGHT: 196.6 LBS | DIASTOLIC BLOOD PRESSURE: 87 MMHG | BODY MASS INDEX: 34.83 KG/M2 | HEART RATE: 70 BPM

## 2023-05-23 DIAGNOSIS — C50.412 MALIGNANT NEOPLASM OF UPPER-OUTER QUADRANT OF LEFT BREAST IN FEMALE, ESTROGEN RECEPTOR POSITIVE: Primary | ICD-10-CM

## 2023-05-23 DIAGNOSIS — Z17.0 MALIGNANT NEOPLASM OF UPPER-OUTER QUADRANT OF LEFT BREAST IN FEMALE, ESTROGEN RECEPTOR POSITIVE: Primary | ICD-10-CM

## 2023-05-23 LAB
RAD ONC ARIA COURSE ID: NORMAL
RAD ONC ARIA COURSE INTENT: NORMAL
RAD ONC ARIA COURSE LAST TREATMENT DATE: NORMAL
RAD ONC ARIA COURSE START DATE: NORMAL
RAD ONC ARIA COURSE TREATMENT ELAPSED DAYS: 36
RAD ONC ARIA FIRST TREATMENT DATE: NORMAL
RAD ONC ARIA PLAN FRACTIONS TREATED TO DATE: 2
RAD ONC ARIA PLAN FRACTIONS TREATED TO DATE: 25
RAD ONC ARIA PLAN ID: NORMAL
RAD ONC ARIA PLAN ID: NORMAL
RAD ONC ARIA PLAN NAME: NORMAL
RAD ONC ARIA PLAN NAME: NORMAL
RAD ONC ARIA PLAN PRESCRIBED DOSE PER FRACTION: 1.8 GY
RAD ONC ARIA PLAN PRESCRIBED DOSE PER FRACTION: 2 GY
RAD ONC ARIA PLAN PRIMARY REFERENCE POINT: NORMAL
RAD ONC ARIA PLAN PRIMARY REFERENCE POINT: NORMAL
RAD ONC ARIA PLAN TOTAL FRACTIONS PRESCRIBED: 25
RAD ONC ARIA PLAN TOTAL FRACTIONS PRESCRIBED: 7
RAD ONC ARIA PLAN TOTAL PRESCRIBED DOSE: 1400 CGY
RAD ONC ARIA PLAN TOTAL PRESCRIBED DOSE: 4500 CGY
RAD ONC ARIA REFERENCE POINT DOSAGE GIVEN TO DATE: 4 GY
RAD ONC ARIA REFERENCE POINT DOSAGE GIVEN TO DATE: 45 GY
RAD ONC ARIA REFERENCE POINT ID: NORMAL
RAD ONC ARIA REFERENCE POINT ID: NORMAL
RAD ONC ARIA REFERENCE POINT SESSION DOSAGE GIVEN: 1.8 GY
RAD ONC ARIA REFERENCE POINT SESSION DOSAGE GIVEN: 2 GY

## 2023-05-23 PROCEDURE — 77412 RADIATION TX DELIVERY LVL 3: CPT | Performed by: RADIOLOGY

## 2023-05-23 PROCEDURE — G6002 STEREOSCOPIC X-RAY GUIDANCE: HCPCS | Performed by: RADIOLOGY

## 2023-05-23 PROCEDURE — 77387 GUIDANCE FOR RADJ TX DLVR: CPT | Performed by: RADIOLOGY

## 2023-05-23 NOTE — PROGRESS NOTES
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  The patient is status post neoadjuvant adjuvant anastrozole therapy and breast conservation surgery.    This patient was seen today for an on treatment visit.  He is receiving postoperative breast radiotherapy.  The patient has completed 4600 cGy to the left breast.  She is currently at 400 cGy of a 1400 cGy boost regimen to the tumor bed region.    Radiation Treatments     Active   Plans   Lt BoostEZ   Most recent treatment: Dose planned: 200 cGy (fraction 2 on 2023)   Total: Dose planned: 1,400 cGy (7 fractions)   Elapsed Days: 36      Reference Points   Lt SC   Most recent treatment: Dose given: 180 cGy (on 2023)   Total: Dose given: 4,500 cGy   Elapsed Days: 36      Lumpectomy Boost   Most recent treatment: Dose given: 200 cGy (on 2023)   Total: Dose given: 400 cGy   Elapsed Days: 36      PTV_Breast   Most recent treatment: Dose given: 200 cGy (on 2023)   Total: Dose given: 4,600 cGy   Elapsed Days: 32         Historical   Plans   Lt Scv [Lt Brst_FiF]   Most recent treatment: Dose planned: 200 cGy (fraction 23 on 2023)   Total: Dose planned: 4,600 cGy (23 fractions)   Elapsed Days: 32      Lt Scv_FiF   Most recent treatment: Dose planned: 180 cGy (fraction 25 on 2023)   Total: Dose planned: 4,500 cGy (25 fractions)   Elapsed Days: 36                  Subjective      Treatment tolerance:  Mrs. Foster appears to be tolerating radiotherapy.  She does report left breast tenderness.  The patient denies chest wall discomfort, pulmonary symptoms, detection of new adenopathy, and left arm swelling.    Medications:     Current Outpatient Medications:   •  amoxicillin (AMOXIL) 875 MG tablet, , Disp: , Rfl:   •  anastrozole (ARIMIDEX) 1 MG tablet, Take 1 tablet by mouth Daily., Disp: , Rfl:   •   fluticasone (FLONASE) 50 MCG/ACT nasal spray, SHAKE LIQUID AND USE 1 SPRAY IN EACH NOSTRIL EVERY 12 HOURS FOR NASAL CONGESTION, Disp: , Rfl:   •  metFORMIN (GLUCOPHAGE) 500 MG tablet, 1 tablet 2 (Two) Times a Day With Meals., Disp: , Rfl:   •  multivitamin (THERAGRAN) tablet tablet, Take  by mouth Daily., Disp: , Rfl:   •  naloxone (NARCAN) 4 MG/0.1ML nasal spray, Use 1 spray into the nostril(s) as directed by provider As Needed (opioid overdose). Call 911. Don't prime. Repeat in 2-3 min if necessary., Disp: 2 each, Rfl: 0  •  ofloxacin (FLOXIN) 0.3 % otic solution, INSTILL 10 DROPS TO AFFECTED EAR EVERY DAY FOR 5 DAYS, Disp: , Rfl:   •  ondansetron (ZOFRAN) 8 MG tablet, , Disp: , Rfl:   •  pantoprazole (PROTONIX) 40 MG EC tablet, TAKE 1 TABLET BY MOUTH TWICE DAILY FOR STOMACH, Disp: , Rfl:   •  propranolol (INDERAL) 40 MG tablet, , Disp: , Rfl:   •  rosuvastatin (CRESTOR) 10 MG tablet, , Disp: , Rfl:   •  Vitamin D, Cholecalciferol, (CHOLECALCIFEROL) 10 MCG (400 UNIT) tablet, Take 1 tablet by mouth Daily., Disp: , Rfl:   •  vitamin E 400 UNIT capsule, Take  by mouth Daily., Disp: , Rfl:     Allergies:   No Known Allergies  Objective     Physical Exam:  Patient is a fit appearing well-nourished middle-aged lady in no acute distress.KPS 90  Vital Signs:  Weight 196.6 pounds, blood pressure 135/87, pulse 70, respirations 18, temperature 97.9, and pulse oximetry 98% on room air  Neck: Short, supple no cervical or periclavicular adenopathy  Heart: Regular rate and rhythm  Lungs clear bilaterally  Breasts: Patient has moderate erythema over the upper lateral aspect of the left breast with some dry desquamative changes.  There are no signs of infection or bleeding.  She has moderate erythema in the inframammary fold region.  Surgical scars remain well-healed.  Lymphatics: No regional adenopathy noted.    Current Total XRT Dose (cGY):    Radiation Treatments     Active   Plans   Lt BoostEZ   Most recent treatment: Dose  planned: 200 cGy (fraction 2 on 5/23/2023)   Total: Dose planned: 1,400 cGy (7 fractions)   Elapsed Days: 36      Reference Points   Lt SC   Most recent treatment: Dose given: 180 cGy (on 5/23/2023)   Total: Dose given: 4,500 cGy   Elapsed Days: 36      Lumpectomy Boost   Most recent treatment: Dose given: 200 cGy (on 5/23/2023)   Total: Dose given: 400 cGy   Elapsed Days: 36      PTV_Breast   Most recent treatment: Dose given: 200 cGy (on 5/19/2023)   Total: Dose given: 4,600 cGy   Elapsed Days: 32         Historical   Plans   Lt Scv [Lt Brst_FiF]   Most recent treatment: Dose planned: 200 cGy (fraction 23 on 5/19/2023)   Total: Dose planned: 4,600 cGy (23 fractions)   Elapsed Days: 32      Lt Scv_FiF   Most recent treatment: Dose planned: 180 cGy (fraction 25 on 5/23/2023)   Total: Dose planned: 4,500 cGy (25 fractions)   Elapsed Days: 36                  Plan      Plan:   Patient was seen today for an on treatment visit. Patient is receiving radiation therapy to the left breast. Patient is stable and tolerating radiation therapy well with minimal side effects. Continue with radiation therapy.  Skin care measures were reviewed.  The patient was given an appointment for Silvadene cream at the usual dose schedule.  The patient will advise me later in the week as to the efficacy of this medication.  I explained to Mrs. Foster that the skin changes should resolve after completion of radiotherapy    I have reviewed treatment setup notes, checked and approved the daily guidance images. I reviewed dose delivery, treatment parameters and deemed them appropriate. We plan to continue radiation therapy as prescribed.     Digital speech recognition software was used to dictate parts of this note, some dictation errors may occur.    Hiren Orosco MD   05/23/23 09:50 EDT

## 2023-05-24 ENCOUNTER — HOSPITAL ENCOUNTER (OUTPATIENT)
Dept: RADIATION ONCOLOGY | Facility: HOSPITAL | Age: 63
Setting detail: RADIATION/ONCOLOGY SERIES
Discharge: HOME | End: 2023-05-24
Payer: COMMERCIAL

## 2023-05-24 LAB
RAD ONC ARIA COURSE ID: NORMAL
RAD ONC ARIA COURSE INTENT: NORMAL
RAD ONC ARIA COURSE LAST TREATMENT DATE: NORMAL
RAD ONC ARIA COURSE START DATE: NORMAL
RAD ONC ARIA COURSE TREATMENT ELAPSED DAYS: 37
RAD ONC ARIA FIRST TREATMENT DATE: NORMAL
RAD ONC ARIA PLAN FRACTIONS TREATED TO DATE: 3
RAD ONC ARIA PLAN ID: NORMAL
RAD ONC ARIA PLAN NAME: NORMAL
RAD ONC ARIA PLAN PRESCRIBED DOSE PER FRACTION: 2 GY
RAD ONC ARIA PLAN PRIMARY REFERENCE POINT: NORMAL
RAD ONC ARIA PLAN TOTAL FRACTIONS PRESCRIBED: 7
RAD ONC ARIA PLAN TOTAL PRESCRIBED DOSE: 1400 CGY
RAD ONC ARIA REFERENCE POINT DOSAGE GIVEN TO DATE: 6 GY
RAD ONC ARIA REFERENCE POINT ID: NORMAL
RAD ONC ARIA REFERENCE POINT SESSION DOSAGE GIVEN: 2 GY

## 2023-05-24 PROCEDURE — 77336 RADIATION PHYSICS CONSULT: CPT | Performed by: RADIOLOGY

## 2023-05-24 PROCEDURE — 77412 RADIATION TX DELIVERY LVL 3: CPT | Performed by: RADIOLOGY

## 2023-05-24 PROCEDURE — G6002 STEREOSCOPIC X-RAY GUIDANCE: HCPCS | Performed by: RADIOLOGY

## 2023-05-24 PROCEDURE — 77427 RADIATION TX MANAGEMENT X5: CPT | Performed by: RADIOLOGY

## 2023-05-24 PROCEDURE — 77387 GUIDANCE FOR RADJ TX DLVR: CPT | Performed by: RADIOLOGY

## 2023-05-25 ENCOUNTER — HOSPITAL ENCOUNTER (OUTPATIENT)
Dept: RADIATION ONCOLOGY | Facility: HOSPITAL | Age: 63
Setting detail: RADIATION/ONCOLOGY SERIES
Discharge: HOME | End: 2023-05-25

## 2023-05-25 ENCOUNTER — DOCUMENTATION (OUTPATIENT)
Dept: ONCOLOGY | Facility: HOSPITAL | Age: 63
End: 2023-05-25
Payer: COMMERCIAL

## 2023-05-25 LAB
RAD ONC ARIA COURSE ID: NORMAL
RAD ONC ARIA COURSE INTENT: NORMAL
RAD ONC ARIA COURSE LAST TREATMENT DATE: NORMAL
RAD ONC ARIA COURSE START DATE: NORMAL
RAD ONC ARIA COURSE TREATMENT ELAPSED DAYS: 38
RAD ONC ARIA FIRST TREATMENT DATE: NORMAL
RAD ONC ARIA PLAN FRACTIONS TREATED TO DATE: 4
RAD ONC ARIA PLAN ID: NORMAL
RAD ONC ARIA PLAN NAME: NORMAL
RAD ONC ARIA PLAN PRESCRIBED DOSE PER FRACTION: 2 GY
RAD ONC ARIA PLAN PRIMARY REFERENCE POINT: NORMAL
RAD ONC ARIA PLAN TOTAL FRACTIONS PRESCRIBED: 7
RAD ONC ARIA PLAN TOTAL PRESCRIBED DOSE: 1400 CGY
RAD ONC ARIA REFERENCE POINT DOSAGE GIVEN TO DATE: 8 GY
RAD ONC ARIA REFERENCE POINT ID: NORMAL
RAD ONC ARIA REFERENCE POINT SESSION DOSAGE GIVEN: 2 GY

## 2023-05-25 PROCEDURE — 77412 RADIATION TX DELIVERY LVL 3: CPT | Performed by: RADIOLOGY

## 2023-05-25 PROCEDURE — G6002 STEREOSCOPIC X-RAY GUIDANCE: HCPCS | Performed by: RADIOLOGY

## 2023-05-25 PROCEDURE — 77387 GUIDANCE FOR RADJ TX DLVR: CPT | Performed by: RADIOLOGY

## 2023-05-25 NOTE — PROGRESS NOTES
SS received email from [employer] stating that patient approved for $500 jing.    SS contacted patient [phone] to make aware that she has been approved and to be looking for check in the mail. No answer on telephone.  SS contacted [phone] to make aware.  Patient to call  if she doesn't receive within 10-11 days.

## 2023-05-26 PROCEDURE — G6002 STEREOSCOPIC X-RAY GUIDANCE: HCPCS | Performed by: RADIOLOGY

## 2023-05-26 PROCEDURE — 77387 GUIDANCE FOR RADJ TX DLVR: CPT | Performed by: RADIOLOGY

## 2023-05-26 PROCEDURE — 77412 RADIATION TX DELIVERY LVL 3: CPT | Performed by: RADIOLOGY

## 2023-05-28 LAB
RAD ONC ARIA COURSE ID: NORMAL
RAD ONC ARIA COURSE INTENT: NORMAL
RAD ONC ARIA COURSE LAST TREATMENT DATE: NORMAL
RAD ONC ARIA COURSE START DATE: NORMAL
RAD ONC ARIA COURSE TREATMENT ELAPSED DAYS: 39
RAD ONC ARIA FIRST TREATMENT DATE: NORMAL
RAD ONC ARIA PLAN FRACTIONS TREATED TO DATE: 5
RAD ONC ARIA PLAN ID: NORMAL
RAD ONC ARIA PLAN NAME: NORMAL
RAD ONC ARIA PLAN PRESCRIBED DOSE PER FRACTION: 2 GY
RAD ONC ARIA PLAN PRIMARY REFERENCE POINT: NORMAL
RAD ONC ARIA PLAN TOTAL FRACTIONS PRESCRIBED: 7
RAD ONC ARIA PLAN TOTAL PRESCRIBED DOSE: 1400 CGY
RAD ONC ARIA REFERENCE POINT DOSAGE GIVEN TO DATE: 10 GY
RAD ONC ARIA REFERENCE POINT ID: NORMAL
RAD ONC ARIA REFERENCE POINT SESSION DOSAGE GIVEN: 2 GY

## 2023-05-30 ENCOUNTER — HOSPITAL ENCOUNTER (OUTPATIENT)
Dept: RADIATION ONCOLOGY | Facility: HOSPITAL | Age: 63
Discharge: HOME | End: 2023-05-30

## 2023-05-30 ENCOUNTER — DOCUMENTATION (OUTPATIENT)
Dept: ONCOLOGY | Facility: HOSPITAL | Age: 63
End: 2023-05-30

## 2023-05-30 VITALS
WEIGHT: 194.8 LBS | DIASTOLIC BLOOD PRESSURE: 89 MMHG | HEART RATE: 74 BPM | SYSTOLIC BLOOD PRESSURE: 121 MMHG | RESPIRATION RATE: 18 BRPM | OXYGEN SATURATION: 97 % | TEMPERATURE: 97.6 F | BODY MASS INDEX: 34.51 KG/M2

## 2023-05-30 DIAGNOSIS — Z17.0 MALIGNANT NEOPLASM OF UPPER-OUTER QUADRANT OF LEFT BREAST IN FEMALE, ESTROGEN RECEPTOR POSITIVE: ICD-10-CM

## 2023-05-30 DIAGNOSIS — C50.412 MALIGNANT NEOPLASM OF UPPER-OUTER QUADRANT OF LEFT BREAST IN FEMALE, ESTROGEN RECEPTOR POSITIVE: ICD-10-CM

## 2023-05-30 DIAGNOSIS — Z17.0 MALIGNANT NEOPLASM OF UPPER-OUTER QUADRANT OF LEFT BREAST IN FEMALE, ESTROGEN RECEPTOR POSITIVE: Primary | ICD-10-CM

## 2023-05-30 DIAGNOSIS — C50.412 MALIGNANT NEOPLASM OF UPPER-OUTER QUADRANT OF LEFT BREAST IN FEMALE, ESTROGEN RECEPTOR POSITIVE: Primary | ICD-10-CM

## 2023-05-30 LAB
RAD ONC ARIA COURSE ID: NORMAL
RAD ONC ARIA COURSE INTENT: NORMAL
RAD ONC ARIA COURSE LAST TREATMENT DATE: NORMAL
RAD ONC ARIA COURSE START DATE: NORMAL
RAD ONC ARIA COURSE TREATMENT ELAPSED DAYS: 43
RAD ONC ARIA FIRST TREATMENT DATE: NORMAL
RAD ONC ARIA PLAN FRACTIONS TREATED TO DATE: 6
RAD ONC ARIA PLAN ID: NORMAL
RAD ONC ARIA PLAN NAME: NORMAL
RAD ONC ARIA PLAN PRESCRIBED DOSE PER FRACTION: 2 GY
RAD ONC ARIA PLAN PRIMARY REFERENCE POINT: NORMAL
RAD ONC ARIA PLAN TOTAL FRACTIONS PRESCRIBED: 7
RAD ONC ARIA PLAN TOTAL PRESCRIBED DOSE: 1400 CGY
RAD ONC ARIA REFERENCE POINT DOSAGE GIVEN TO DATE: 12 GY
RAD ONC ARIA REFERENCE POINT ID: NORMAL
RAD ONC ARIA REFERENCE POINT SESSION DOSAGE GIVEN: 2 GY

## 2023-05-30 PROCEDURE — 77412 RADIATION TX DELIVERY LVL 3: CPT | Performed by: RADIOLOGY

## 2023-05-30 PROCEDURE — G6002 STEREOSCOPIC X-RAY GUIDANCE: HCPCS | Performed by: RADIOLOGY

## 2023-05-30 PROCEDURE — 77387 GUIDANCE FOR RADJ TX DLVR: CPT | Performed by: RADIOLOGY

## 2023-05-30 NOTE — PROGRESS NOTES
On Treatment Visit Note      Patient Name: Terri Foster  : 1960   MRN: [id number]     Diagnosis:    Chief Complaint   Patient presents with   • Breast Cancer     Malignant Neoplasm of Upper-Outer Quadrant of Left Breast       Staging: No matching staging information was found for the patient.     This patient was seen today for an on treatment visit.     Radiation Treatments     Active   Plans   Lt BoostEZ   Most recent treatment: Dose planned: 200 cGy (fraction 6 on 2023)   Total: Dose planned: 1,400 cGy (7 fractions)   Elapsed Days: 43      Reference Points   Lt SC   Most recent treatment: Dose given: 180 cGy (on 2023)   Total: Dose given: 4,500 cGy   Elapsed Days: 36      Lumpectomy Boost   Most recent treatment: Dose given: 200 cGy (on 2023)   Total: Dose given: 1,200 cGy   Elapsed Days: 43      PTV_Breast   Most recent treatment: Dose given: 200 cGy (on 2023)   Total: Dose given: 4,600 cGy   Elapsed Days: 32         Historical   Plans   Lt Scv [Lt Brst_FiF]   Most recent treatment: Dose planned: 200 cGy (fraction 23 on 2023)   Total: Dose planned: 4,600 cGy (23 fractions)   Elapsed Days: 32      Lt Scv_FiF   Most recent treatment: Dose planned: 180 cGy (fraction 25 on 2023)   Total: Dose planned: 4,500 cGy (25 fractions)   Elapsed Days: 36                  Subjective      Review of Systems:   Review of Systems    Medications:     Current Outpatient Medications:   •  amoxicillin (AMOXIL) 875 MG tablet, , Disp: , Rfl:   •  anastrozole (ARIMIDEX) 1 MG tablet, Take 1 tablet by mouth Daily., Disp: , Rfl:   •  fluticasone (FLONASE) 50 MCG/ACT nasal spray, SHAKE LIQUID AND USE 1 SPRAY IN EACH NOSTRIL EVERY 12 HOURS FOR NASAL CONGESTION, Disp: , Rfl:   •  metFORMIN (GLUCOPHAGE) 500 MG tablet, 1 tablet 2 (Two) Times a Day With Meals., Disp: , Rfl:   •  multivitamin (THERAGRAN) tablet tablet, Take  by mouth Daily., Disp: , Rfl:   •  naloxone (NARCAN) 4 MG/0.1ML nasal spray, Use  1 spray into the nostril(s) as directed by provider As Needed (opioid overdose). Call 911. Don't prime. Repeat in 2-3 min if necessary., Disp: 2 each, Rfl: 0  •  ofloxacin (FLOXIN) 0.3 % otic solution, INSTILL 10 DROPS TO AFFECTED EAR EVERY DAY FOR 5 DAYS, Disp: , Rfl:   •  ondansetron (ZOFRAN) 8 MG tablet, , Disp: , Rfl:   •  pantoprazole (PROTONIX) 40 MG EC tablet, TAKE 1 TABLET BY MOUTH TWICE DAILY FOR STOMACH, Disp: , Rfl:   •  propranolol (INDERAL) 40 MG tablet, , Disp: , Rfl:   •  rosuvastatin (CRESTOR) 10 MG tablet, , Disp: , Rfl:   •  silver sulfadiazine (SILVADENE, SSD) 1 % cream, Apply 1 application topically to the appropriate area as directed 2 (Two) Times a Day., Disp: 50 g, Rfl: 0  •  Vitamin D, Cholecalciferol, (CHOLECALCIFEROL) 10 MCG (400 UNIT) tablet, Take 1 tablet by mouth Daily., Disp: , Rfl:   •  vitamin E 400 UNIT capsule, Take  by mouth Daily., Disp: , Rfl:     Allergies:   No Known Allergies  Objective     Physical Exam:  Physical Exam skin erythema and tanning of the breast with some skin peeling in the inframammary fold.    Vital Signs:   Vitals:    05/30/23 0928   BP: 121/89   Pulse: 74   Resp: 18   Temp: 97.6 °F (36.4 °C)   TempSrc: Temporal   SpO2: 97%   Weight: 88.4 kg (194 lb 12.8 oz)   PainSc: 0-No pain     Body mass index is 34.51 kg/m².     Current Total XRT Dose (cGY):    Radiation Treatments     Active   Plans   Lt BoostEZ   Most recent treatment: Dose planned: 200 cGy (fraction 6 on 5/30/2023)   Total: Dose planned: 1,400 cGy (7 fractions)   Elapsed Days: 43      Reference Points   Lt SC   Most recent treatment: Dose given: 180 cGy (on 5/23/2023)   Total: Dose given: 4,500 cGy   Elapsed Days: 36      Lumpectomy Boost   Most recent treatment: Dose given: 200 cGy (on 5/30/2023)   Total: Dose given: 1,200 cGy   Elapsed Days: 43      PTV_Breast   Most recent treatment: Dose given: 200 cGy (on 5/19/2023)   Total: Dose given: 4,600 cGy   Elapsed Days: 32         Historical   Plans   Lt  Scv [Lt Brst_FiF]   Most recent treatment: Dose planned: 200 cGy (fraction 23 on 5/19/2023)   Total: Dose planned: 4,600 cGy (23 fractions)   Elapsed Days: 32      Lt Scv_FiF   Most recent treatment: Dose planned: 180 cGy (fraction 25 on 5/23/2023)   Total: Dose planned: 4,500 cGy (25 fractions)   Elapsed Days: 36                  Plan      Plan:   Patient was seen today for an on treatment visit. Patient is receiving radiation therapy to the Carcinoma of the left breast.. Patient is stable and tolerating radiation therapy with moderate erythema and tanning of the axilla and inframammary folds for which she has been using Silvadene.  She has minimal fatigue and also some skin peeling.  We will continue with her radiation treatments and will complete her therapy tomorrow.   I have reviewed treatment setup notes, checked and approved the daily guidance images. I reviewed dose delivery, treatment parameters and deemed them appropriate. We plan to continue radiation therapy as prescribed.     Digital speech recognition software was used to dictate parts of this note, some dictation errors may occur.    Jenifer Damon MD   05/30/23 10:02 EDT

## 2023-05-31 ENCOUNTER — HOSPITAL ENCOUNTER (OUTPATIENT)
Dept: RADIATION ONCOLOGY | Facility: HOSPITAL | Age: 63
Discharge: HOME | End: 2023-05-31

## 2023-05-31 LAB
RAD ONC ARIA COURSE END DATE: NORMAL
RAD ONC ARIA COURSE ID: NORMAL
RAD ONC ARIA COURSE ID: NORMAL
RAD ONC ARIA COURSE INTENT: NORMAL
RAD ONC ARIA COURSE INTENT: NORMAL
RAD ONC ARIA COURSE LAST TREATMENT DATE: NORMAL
RAD ONC ARIA COURSE LAST TREATMENT DATE: NORMAL
RAD ONC ARIA COURSE START DATE: NORMAL
RAD ONC ARIA COURSE START DATE: NORMAL
RAD ONC ARIA COURSE TREATMENT ELAPSED DAYS: 44
RAD ONC ARIA COURSE TREATMENT ELAPSED DAYS: 44
RAD ONC ARIA FIRST TREATMENT DATE: NORMAL
RAD ONC ARIA FIRST TREATMENT DATE: NORMAL
RAD ONC ARIA PLAN FRACTIONS TREATED TO DATE: 23
RAD ONC ARIA PLAN FRACTIONS TREATED TO DATE: 25
RAD ONC ARIA PLAN FRACTIONS TREATED TO DATE: 7
RAD ONC ARIA PLAN FRACTIONS TREATED TO DATE: 7
RAD ONC ARIA PLAN ID: NORMAL
RAD ONC ARIA PLAN NAME: NORMAL
RAD ONC ARIA PLAN PRESCRIBED DOSE PER FRACTION: 1.8 GY
RAD ONC ARIA PLAN PRESCRIBED DOSE PER FRACTION: 2 GY
RAD ONC ARIA PLAN PRIMARY REFERENCE POINT: NORMAL
RAD ONC ARIA PLAN TOTAL FRACTIONS PRESCRIBED: 23
RAD ONC ARIA PLAN TOTAL FRACTIONS PRESCRIBED: 25
RAD ONC ARIA PLAN TOTAL FRACTIONS PRESCRIBED: 7
RAD ONC ARIA PLAN TOTAL FRACTIONS PRESCRIBED: 7
RAD ONC ARIA PLAN TOTAL PRESCRIBED DOSE: 1400 CGY
RAD ONC ARIA PLAN TOTAL PRESCRIBED DOSE: 1400 CGY
RAD ONC ARIA PLAN TOTAL PRESCRIBED DOSE: 4500 CGY
RAD ONC ARIA PLAN TOTAL PRESCRIBED DOSE: 4600 CGY
RAD ONC ARIA REFERENCE POINT DOSAGE GIVEN TO DATE: 14 GY
RAD ONC ARIA REFERENCE POINT DOSAGE GIVEN TO DATE: 14 GY
RAD ONC ARIA REFERENCE POINT DOSAGE GIVEN TO DATE: 45 GY
RAD ONC ARIA REFERENCE POINT DOSAGE GIVEN TO DATE: 46 GY
RAD ONC ARIA REFERENCE POINT ID: NORMAL
RAD ONC ARIA REFERENCE POINT SESSION DOSAGE GIVEN: 2 GY

## 2023-05-31 PROCEDURE — 77412 RADIATION TX DELIVERY LVL 3: CPT | Performed by: RADIOLOGY

## 2023-05-31 PROCEDURE — 77387 GUIDANCE FOR RADJ TX DLVR: CPT | Performed by: RADIOLOGY

## 2023-05-31 PROCEDURE — G6002 STEREOSCOPIC X-RAY GUIDANCE: HCPCS | Performed by: RADIOLOGY

## 2023-06-14 ENCOUNTER — OFFICE VISIT (OUTPATIENT)
Dept: RADIATION ONCOLOGY | Facility: HOSPITAL | Age: 63
End: 2023-06-14
Payer: COMMERCIAL

## 2023-06-14 VITALS
TEMPERATURE: 97.8 F | WEIGHT: 197 LBS | OXYGEN SATURATION: 95 % | BODY MASS INDEX: 34.9 KG/M2 | HEART RATE: 65 BPM | DIASTOLIC BLOOD PRESSURE: 79 MMHG | SYSTOLIC BLOOD PRESSURE: 125 MMHG | RESPIRATION RATE: 18 BRPM

## 2023-06-14 DIAGNOSIS — Z17.0 MALIGNANT NEOPLASM OF UPPER-OUTER QUADRANT OF LEFT BREAST IN FEMALE, ESTROGEN RECEPTOR POSITIVE: Primary | ICD-10-CM

## 2023-06-14 DIAGNOSIS — C50.412 MALIGNANT NEOPLASM OF UPPER-OUTER QUADRANT OF LEFT BREAST IN FEMALE, ESTROGEN RECEPTOR POSITIVE: Primary | ICD-10-CM

## 2023-06-14 PROCEDURE — G0463 HOSPITAL OUTPT CLINIC VISIT: HCPCS | Performed by: RADIOLOGY

## 2023-06-14 NOTE — PROGRESS NOTES
Established Patient Visit      Patient: Terri Foster   YOB: 1960   Medical Record Number: [id number]   Date of Visit: June 14, 2023   Primary Diagnosis:  Cancer Staging   No matching staging information was found for the patient.  Cancer left breast  ICD 10 Code: 50.919                                            History of Present Illness: Ms. Foster returns today for a 2-week skin check.  She is known to have a stage IIb carcinoma of the left breast.  She completed her breast conserving radiation therapy 2 weeks ago.  She is very happy with the way her skin is healed.  She has no local regional or metastatic complaints.    (Old medical records were requested, reviewed, and summarized in the HPI)    Review of Systems       All other systems reviewed and are negative.      Past Medical History:   Diagnosis Date    Diabetes mellitus     Infectious viral hepatitis     Migraines         Past Surgical History:   Procedure Laterality Date    BREAST LUM[facility]OMY Right [facility]/2023    [facility]    GALLBLADDER SURGERY      TONSILLECTOMY        Family History   Problem Relation Age of Onset    Diabetes Mother     Hyperlipidemia Mother     Diabetes Father     Heart disease Father     Hyperlipidemia Father     Cancer Brother         skin    Diabetes Brother     Hyperlipidemia Brother     Breast cancer Neg Hx         Social History     Socioeconomic History    Marital status: Legally    Tobacco Use    Smoking status: Never    Smokeless tobacco: Never   Vaping Use    Vaping Use: Never used   Substance and Sexual Activity    Alcohol use: Never    Drug use: Never    Sexual activity: Defer         Allergies:  Patient has no known allergies.   Prior to Admission medications    Medication Sig Start Date End Date Taking? Authorizing Provider   amoxicillin (AMOXIL) 875 MG tablet  2/6/23  Yes Provider, MD Cathy   anastrozole (ARIMIDEX) 1 MG tablet Take 1 tablet by mouth Daily. 2/17/23 2/17/24 Yes Provider  MD Cathy   fluticasone (FLONASE) 50 MCG/ACT nasal spray SHAKE LIQUID AND USE 1 SPRAY IN EACH NOSTRIL EVERY 12 HOURS FOR NASAL CONGESTION 11/17/22  Yes Cathy House MD   metFORMIN (GLUCOPHAGE) 500 MG tablet 1 tablet 2 (Two) Times a Day With Meals. 5/19/22  Yes Cathy House MD   multivitamin (THERAGRAN) tablet tablet Take  by mouth Daily.   Yes Cathy House MD   naloxone (NARCAN) 4 MG/0.1ML nasal spray Use 1 spray into the nostril(s) as directed by provider As Needed (opioid overdose). Call 911. Don't prime. Repeat in 2-3 min if necessary. 1/22/19  Yes Shanelle Marie MD   ofloxacin (FLOXIN) 0.3 % otic solution INSTILL 10 DROPS TO AFFECTED EAR EVERY DAY FOR 5 DAYS 2/6/23  Yes Cathy House MD   ondansetron (ZOFRAN) 8 MG tablet  11/18/22  Yes Cathy House MD   pantoprazole (PROTONIX) 40 MG EC tablet TAKE 1 TABLET BY MOUTH TWICE DAILY FOR STOMACH 5/19/22  Yes Cathy House MD   propranolol (INDERAL) 40 MG tablet  4/19/22  Yes Cathy House MD   rosuvastatin (CRESTOR) 10 MG tablet  4/19/22  Yes Cathy House MD   silver sulfadiazine (SILVADENE, SSD) 1 % cream Apply 1 application topically to the appropriate area as directed 2 (Two) Times a Day. 5/30/23  Yes Jenifer Damon MD   Vitamin D, Cholecalciferol, (CHOLECALCIFEROL) 10 MCG (400 UNIT) tablet Take 1 tablet by mouth Daily.   Yes Cathy House MD   vitamin E 400 UNIT capsule Take  by mouth Daily.   Yes Cathy House MD      Pain:(on a scale of 0-10)    Pain Score    06/14/23 1027   PainSc: 0-No pain        Terri Foster reports a pain score of 0.  Given her pain assessment as noted, treatment options were discussed and the following options were decided upon as a follow-up plan to address the patient's pain: continuation of current treatment plan for pain.       Quality of Life:   KPS: 100 - Full Activity  ECOG: (0) Fully active, able to carry on all predisease performance without  restriction        Physical Examination:  Vitals:  VITALS@    Height:    Weight: Weight: 89.4 kg (197 lb) Body mass index is 34.9 kg/m².    Physical Exam  Constitutional: The patient is a well-developed, well-nourished 63-year-old female in no acute distress.  Alert and oriented ×3.  HEENT: Atraumatic. Normocephalic. No abnormalities noted.  Lymphatics: No cervical, supraclavicular, or axillary, or inguinal lymphadenopathy is palpated.  Breasts: Surgical scars in the left breast breast and axilla are well healed, and there are no suspicious lesions or nodules palpated.  Only faint hyperpigmentation remains.  All previous areas of moist desquamation have healed.  Extremities: No clubbing, cyanosis, or edema.  Psychiatric: Alert and oriented x3. Normal affect, with no anxiety or depression noted.    Radiographs : No new  Pathology: No new  Labs:   Lab Results   Component Value Date    CREATININE 0.70 07/06/2022      WBC   Date Value Ref Range Status   03/10/2023 7.67 3.70 - 10.30 10*3/uL Final     Hemoglobin   Date Value Ref Range Status   03/10/2023 14.1 11.2 - 15.7 g/dL Final     Hematocrit   Date Value Ref Range Status   03/10/2023 42.0 34.0 - 45.0 % Final     Platelets   Date Value Ref Range Status   03/10/2023 245 155 - 369 10*3/uL Final    No results found for: PSA No results found for: CEA           ASSESSMENT/PLAN: The patient is 2 weeks status post breast conserving radiation therapy.  Her skin is healed very very well.  There was no evidence of local regional or systemic disease.  Of asked the patient to return in 3 months for her first follow-up visit.    Sincerely,      Electronically signed by Immanuel Bay MD 6/14/2023  10:38 EDT

## 2023-08-10 ENCOUNTER — DOCUMENTATION (OUTPATIENT)
Dept: ONCOLOGY | Facility: HOSPITAL | Age: 63
End: 2023-08-10
Payer: COMMERCIAL

## 2023-08-10 NOTE — PROGRESS NOTES
SS received email per Leigh Garcia with [facility] stating that patient had been mailed travel assistance check in April 2023 and check has not been cashed.    SS contacted patient [phone]. Patient staes that she doesn't remember receiving a check from [facility], but will go through her paperwork and call SS back tomorrow.    SS to make Leigh aware.

## 2023-08-18 ENCOUNTER — DOCUMENTATION (OUTPATIENT)
Dept: ONCOLOGY | Facility: HOSPITAL | Age: 63
End: 2023-08-18
Payer: COMMERCIAL

## 2023-08-18 NOTE — PROGRESS NOTES
SS contacted patient [phone] to make aware that SS has spoken with Leigh Garcia with [facility]. Leigh to mail paper check to patient.    SS will follow and assist.

## 2023-09-13 ENCOUNTER — HOSPITAL ENCOUNTER (OUTPATIENT)
Dept: RADIATION ONCOLOGY | Facility: HOSPITAL | Age: 63
Setting detail: RADIATION/ONCOLOGY SERIES
End: 2023-09-13
Payer: COMMERCIAL

## 2023-09-13 ENCOUNTER — OFFICE VISIT (OUTPATIENT)
Dept: RADIATION ONCOLOGY | Facility: HOSPITAL | Age: 63
End: 2023-09-13
Payer: COMMERCIAL

## 2023-09-13 VITALS
RESPIRATION RATE: 18 BRPM | WEIGHT: 200.8 LBS | BODY MASS INDEX: 35.57 KG/M2 | DIASTOLIC BLOOD PRESSURE: 60 MMHG | TEMPERATURE: 97.8 F | OXYGEN SATURATION: 100 % | SYSTOLIC BLOOD PRESSURE: 153 MMHG | HEART RATE: 77 BPM

## 2023-09-13 DIAGNOSIS — C50.412 MALIGNANT NEOPLASM OF UPPER-OUTER QUADRANT OF LEFT BREAST IN FEMALE, ESTROGEN RECEPTOR POSITIVE: Primary | ICD-10-CM

## 2023-09-13 DIAGNOSIS — Z17.0 MALIGNANT NEOPLASM OF UPPER-OUTER QUADRANT OF LEFT BREAST IN FEMALE, ESTROGEN RECEPTOR POSITIVE: Primary | ICD-10-CM

## 2023-09-13 PROCEDURE — G0463 HOSPITAL OUTPT CLINIC VISIT: HCPCS | Performed by: RADIOLOGY

## 2023-09-13 NOTE — PROGRESS NOTES
Established Patient Visit      Patient: Terri Foster   YOB: 1960   Medical Record Number: [id number]   Date of Visit: September 13, 2023   Primary Diagnosis:  Cancer Staging   . Malignant neoplasm of upper-outer quadrant of left breast in female, estrogen receptor positive [C50.412, Z17.0]  ICD 10 Code:                                             History of Present Illness: Mrs. Foster returns to our department for a 4-month follow-up after completing a course of breast conserving radiation therapy.    Her pertinent history of breast cancer dates back to January of this year.  She underwent a lumpectomy and sentinel lymph node evaluation on 1/19/2023.  She underwent a course of breast conserving radiation therapy.  Her whole breast was treated between 4/17 and 5/19/2023.  The whole breast received 4600 cGy and 23 equal fractions of 200 cGy per fraction.  The tumor bed was received a boost between 522 and 3531 2023.  The boost cavity received an additional 1400 centigrade.  The left supraclavicular fossa received 4500 cGy as well.  She returned for a 2-week skin check in June and was doing well at this time.  Since that time she had a mammogram and ultrasound performed at .  Postoperative and postradiation changes were identified.  No suspicious lesions were seen.        (Old medical records were requested, reviewed, and summarized in the HPI)    Review of Systems       All other systems reviewed and are negative.        Past Medical History:   Diagnosis Date    Diabetes mellitus     Infectious viral hepatitis     Migraines         Past Surgical History:   Procedure Laterality Date    BREAST LUMPECTOMY Right 01/2023    @ [facility]    GALLBLADDER SURGERY      TONSILLECTOMY        Family History   Problem Relation Age of Onset    Diabetes Mother     Hyperlipidemia Mother     Diabetes Father     Heart disease Father     Hyperlipidemia Father     Cancer Brother         skin    Diabetes Brother      Hyperlipidemia Brother     Breast cancer Neg Hx         Social History     Socioeconomic History    Marital status: Legally    Tobacco Use    Smoking status: Never    Smokeless tobacco: Never   Vaping Use    Vaping Use: Never used   Substance and Sexual Activity    Alcohol use: Never    Drug use: Never    Sexual activity: Defer         Allergies:  Patient has no known allergies.   Prior to Admission medications    Medication Sig Start Date End Date Taking? Authorizing Provider   amoxicillin (AMOXIL) 875 MG tablet  2/6/23   Cathy House MD   anastrozole (ARIMIDEX) 1 MG tablet Take 1 tablet by mouth Daily. 2/17/23 2/17/24  Cathy House MD   fluticasone (FLONASE) 50 MCG/ACT nasal spray SHAKE LIQUID AND USE 1 SPRAY IN EACH NOSTRIL EVERY 12 HOURS FOR NASAL CONGESTION 11/17/22   Cathy House MD   metFORMIN (GLUCOPHAGE) 500 MG tablet 1 tablet 2 (Two) Times a Day With Meals. 5/19/22   Cathy House MD   multivitamin (THERAGRAN) tablet tablet Take  by mouth Daily.    Cathy House MD   naloxone (NARCAN) 4 MG/0.1ML nasal spray Use 1 spray into the nostril(s) as directed by provider As Needed (opioid overdose). Call 911. Don't prime. Repeat in 2-3 min if necessary. 1/22/19   Shanelle Marie MD   ofloxacin (FLOXIN) 0.3 % otic solution INSTILL 10 DROPS TO AFFECTED EAR EVERY DAY FOR 5 DAYS 2/6/23   Cathy House MD   ondansetron (ZOFRAN) 8 MG tablet  11/18/22   Cathy House MD   pantoprazole (PROTONIX) 40 MG EC tablet TAKE 1 TABLET BY MOUTH TWICE DAILY FOR STOMACH 5/19/22   Cathy House MD   propranolol (INDERAL) 40 MG tablet  4/19/22   Cathy House MD   rosuvastatin (CRESTOR) 10 MG tablet  4/19/22   Cathy House MD   silver sulfadiazine (SILVADENE, SSD) 1 % cream Apply 1 application topically to the appropriate area as directed 2 (Two) Times a Day. 5/30/23   Jenifer Damon MD   Vitamin D, Cholecalciferol, (CHOLECALCIFEROL) 10 MCG (400  UNIT) tablet Take 1 tablet by mouth Daily.    ProviderCathy MD   vitamin E 400 UNIT capsule Take  by mouth Daily.    Provider, MD Cathy      Pain: 0 (on a scale of 0-10)    There were no vitals filed for this visit.     Terri Foster reports a pain score of 0 .   Quality of Life:   KPS: 100 - Full Activity  ECOG: (0) Fully active, able to carry on all predisease performance without restriction        Physical Examination:  Vitals:  VITALS@    Height:    Weight:   There is no height or weight on file to calculate BMI.    Physical Exam  Constitutional: The patient is a well-developed, well-nourished 63-year-old female in no acute distress.  Alert and oriented ×3.  HEENT: Atraumatic. Normocephalic. No abnormalities noted.  Lymphatics: No cervical, supraclavicular, or axillary, or inguinal lymphadenopathy is palpated.  CV: Regular rate and rhythm.  No murmurs, rubs, or gallops are appreciated.  Respiratory: Lungs clear to auscultation.  Breath sounds equal bilaterally.  Breasts: Surgical scars in the left breast and axilla are well healed, and there are no suspicious lesions or nodules palpated.  There is faint erythema in the axillary tail.  Cosmetic results of treatment are quite good.  The right  breast is within normal limits, with no suspicious lesions or nodules palpated.  GI: Abdomen soft, nontender, nondistended, with no hepatosplenomegaly or masses palpated.  Extremities: No clubbing, cyanosis, or edema.  Neurologic: Cranial nerves II through XII are grossly intact, with no focal neurological deficits noted on exam.  Psychiatric: Alert and oriented x3. Normal affect, with no anxiety or depression noted.    Radiographs : 8/3/2023 mammogram and ultrasound at   Pathology: No new pathology  Labs:   Lab Results   Component Value Date    CREATININE 0.70 07/06/2022      WBC   Date Value Ref Range Status   03/10/2023 7.67 3.70 - 10.30 10*3/uL Final     Hemoglobin   Date Value Ref Range Status    03/10/2023 14.1 11.2 - 15.7 g/dL Final     Hematocrit   Date Value Ref Range Status   03/10/2023 42.0 34.0 - 45.0 % Final     Platelets   Date Value Ref Range Status   03/10/2023 245 155 - 369 10*3/uL Final    No results found for: PSA No results found for: CEA           ASSESSMENT/PLAN: The patient is seen today in follow-up for her cancer of the left breast.  She completed her breast conserving radiation therapy on 5/31/2023.  There is no clinical or radiographic evidence of disease at this time.  I have asked her to continue self-examination have a follow-up mammogram in February and return to see us in 6 months.    Sincerely,      Electronically signed by Immanuel Bay MD 9/13/2023  09:26 EDT

## 2024-03-14 ENCOUNTER — OFFICE VISIT (OUTPATIENT)
Dept: RADIATION ONCOLOGY | Facility: HOSPITAL | Age: 64
End: 2024-03-14
Payer: COMMERCIAL

## 2024-03-14 VITALS
DIASTOLIC BLOOD PRESSURE: 90 MMHG | BODY MASS INDEX: 37.84 KG/M2 | HEART RATE: 81 BPM | SYSTOLIC BLOOD PRESSURE: 187 MMHG | OXYGEN SATURATION: 98 % | TEMPERATURE: 97.1 F | WEIGHT: 213.6 LBS | RESPIRATION RATE: 18 BRPM

## 2024-03-14 DIAGNOSIS — Z17.0 MALIGNANT NEOPLASM OF UPPER-OUTER QUADRANT OF LEFT BREAST IN FEMALE, ESTROGEN RECEPTOR POSITIVE: Primary | ICD-10-CM

## 2024-03-14 DIAGNOSIS — C50.412 MALIGNANT NEOPLASM OF UPPER-OUTER QUADRANT OF LEFT BREAST IN FEMALE, ESTROGEN RECEPTOR POSITIVE: Primary | ICD-10-CM

## 2024-03-14 PROCEDURE — G0463 HOSPITAL OUTPT CLINIC VISIT: HCPCS | Performed by: RADIOLOGY

## 2024-03-14 RX ORDER — CHLORAL HYDRATE 500 MG
CAPSULE ORAL
COMMUNITY

## 2024-03-14 RX ORDER — LECITHIN 1200 MG
CAPSULE ORAL
COMMUNITY

## 2024-03-14 RX ORDER — GLIMEPIRIDE 2 MG/1
TABLET ORAL
COMMUNITY
Start: 2023-12-18

## 2024-03-14 RX ORDER — ERGOCALCIFEROL 1.25 MG/1
CAPSULE ORAL
COMMUNITY
Start: 2024-03-13

## 2024-03-14 RX ORDER — ANASTROZOLE 1 MG/1
TABLET ORAL DAILY
COMMUNITY

## 2024-03-14 RX ORDER — BIOTIN 1 MG
1000 TABLET ORAL DAILY
COMMUNITY

## 2024-03-14 NOTE — PROGRESS NOTES
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 Terri Foster is a 64-year-old lady with ductal carcinoma of the left breast diagnosed in mid December 2022.  Biopsies showed the malignancy to be estrogen and progesterone receptor positive.  HER2 was not overexpressed.  The patient was enrolled in the CHRISTAL-54 trial to examine the efficacy of neoadjuvant endocrine therapy in ER-positive, HER2 negative early breast cancers.  The patient received anastrozole.    Mrs. Foster underwent a breast conservation surgery procedure with negative sentinel lymph node biopsies on 01/19/2023.  The patient was noted to have a 2.5 cm ductal carcinoma and low-grade DCIS.  Micrometastases were noted in 1 of 2 sentinel lymph nodes.    The patient completed a course of right breast radiotherapy at this center on 05/31/2023.  Radiotherapy was well-tolerated.  The patient returns today for follow-up evaluation.    Left mammograms (08/03/2023) revealed benign findings.  Bilateral diagnostic mammograms will be obtained (in November 2024).    The patient remains on Arimidex.  She is tolerating endocrine therapy well.  The patient denies detection of new breast masses and regional adenopathy.  She is pleased with the excellent cosmetic result that has been achieved.  The patient denies chest wall discomfort, and arm pain or swelling.  The patient notes occasional tightness of the left breast skin when compared to the right.  The patient reports occasional dyspnea on exertion when walking long distances.  She can carry out her normal daily activities without  compromise.    Review of Symptoms:  A 14 point review of systems is negative.    Past Medical History:   Diagnosis Date    Diabetes mellitus     Infectious viral hepatitis     Migraines         Past Surgical History:   Procedure Laterality Date    BREAST LUMPECTOMY Right 01/2023    @ [facility]    GALLBLADDER SURGERY      TONSILLECTOMY        Family History   Problem Relation Age of Onset    Diabetes Mother     Hyperlipidemia Mother     Diabetes Father     Heart disease Father     Hyperlipidemia Father     Cancer Brother         skin    Diabetes Brother     Hyperlipidemia Brother     Breast cancer Neg Hx         Social History     Socioeconomic History    Marital status: Legally    Tobacco Use    Smoking status: Never    Smokeless tobacco: Never   Vaping Use    Vaping status: Never Used   Substance and Sexual Activity    Alcohol use: Never    Drug use: Never    Sexual activity: Defer       Allergies:  Patient has no known allergies.   Prior to Admission medications    Medication Sig Start Date End Date Taking? Authorizing Provider   amoxicillin (AMOXIL) 875 MG tablet  2/6/23  Yes Cathy House MD   anastrozole (ARIMIDEX) 1 MG tablet Take  by mouth Daily.   Yes Cathy House MD   Biotin 1000 MCG tablet Take 1,000 mcg by mouth Daily.   Yes Cathy Houes MD   Black Cohosh 160 MG capsule Take  by mouth.   Yes Cathy House MD   fluticasone (FLONASE) 50 MCG/ACT nasal spray SHAKE LIQUID AND USE 1 SPRAY IN EACH NOSTRIL EVERY 12 HOURS FOR NASAL CONGESTION 11/17/22  Yes Cathy House MD   glimepiride (AMARYL) 2 MG tablet  12/18/23  Yes Cathy House MD   metFORMIN (GLUCOPHAGE) 500 MG tablet 1 tablet 2 (Two) Times a Day With Meals. 5/19/22  Yes Cathy House MD   multivitamin (THERAGRAN) tablet tablet Take  by mouth Daily.   Yes Cathy House MD   naloxone (NARCAN) 4 MG/0.1ML nasal spray Use 1 spray into the nostril(s) as directed by provider As Needed  (opioid overdose). Call 911. Don't prime. Repeat in 2-3 min if necessary. 19  Yes Shanelle Marie MD   ofloxacin (FLOXIN) 0.3 % otic solution INSTILL 10 DROPS TO AFFECTED EAR EVERY DAY FOR 5 DAYS 23  Yes Cathy House MD   Omega-3 Fatty Acids (fish oil) 1000 MG capsule capsule Take  by mouth Daily With Breakfast.   Yes Cathy House MD   pantoprazole (PROTONIX) 40 MG EC tablet TAKE 1 TABLET BY MOUTH TWICE DAILY FOR STOMACH 22  Yes Cathy House MD   propranolol (INDERAL) 40 MG tablet  22  Yes Cathy House MD   rosuvastatin (CRESTOR) 10 MG tablet  22  Yes Cathy House MD   silver sulfadiazine (SILVADENE, SSD) 1 % cream Apply 1 application topically to the appropriate area as directed 2 (Two) Times a Day. 23  Yes Jenifer Damon MD   vitamin D (ERGOCALCIFEROL) 1.25 MG (80511 UT) capsule capsule  3/13/24  Yes Cathy House MD   Vitamin D, Cholecalciferol, (CHOLECALCIFEROL) 10 MCG (400 UNIT) tablet Take 1 tablet by mouth Daily.   Yes Cathy House MD   vitamin E 400 UNIT capsule Take  by mouth Daily.   Yes Cathy House MD   ondansetron (ZOFRAN) 8 MG tablet  22   Cathy House MD      Pain:(on a scale of 0-10)    Pain Score    24 1342   PainSc: 0-No pain        Terri Foster reports a pain score of 0.       Quality of Life:   KPS: 100  ECO    Physical Examination:  Vitals: /90 mm Hg (patient advised) pulse 81, respirations 18, temperature 97.1 °F, pulse oximetry on room air 98%.  Weight: Weight: 96.9 kg (213 lb 9.6 oz) Body mass index is 37.84 kg/m².    Constitutional: The patient is a well-developed, well-nourished appearing white female in no acute distress.  Alert and oriented ×3.  HEENT: Atraumatic. Normocephalic. No abnormalities noted.  Lymphatics: No cervical, supraclavicular, or axillary lymphadenopathy is palpated.  CV: Regular rate and rhythm.  No murmurs, rubs, or gallops are  "appreciated.  Respiratory: Lungs clear to auscultation.  Breath sounds equal bilaterally.  Breasts: Surgical scars in the left breast are well healed, and there are no suspicious lesions or nodules palpated. The right breast is within normal limits, with no suspicious lesions or nodules palpated.  GI: Abdomen soft, nontender, nondistended, with no hepatosplenomegaly or masses palpated.  Extremities: No clubbing, cyanosis, or edema.  Neurologic: Cranial nerves II through XII are grossly intact, with no focal neurological deficits noted on exam.  Psychiatric: Alert and oriented x3. Normal affect, with no anxiety or depression noted.    Radiographs :   Left mammogram and ultrasound (08/03/2023) described above.    Pathology:   Described above    Labs:   Lab Results   Component Value Date    CREATININE 0.70 07/06/2022      WBC   Date Value Ref Range Status   03/10/2023 7.67 3.70 - 10.30 10*3/uL Final     Hemoglobin   Date Value Ref Range Status   03/10/2023 14.1 11.2 - 15.7 g/dL Final     Hematocrit   Date Value Ref Range Status   03/10/2023 42.0 34.0 - 45.0 % Final     Platelets   Date Value Ref Range Status   03/10/2023 245 155 - 369 10*3/uL Final    No results found for: \"PSA\" No results found for: \"CEA\"     Assessment:  Stage IIB (yp T2/Tis ypN1 sn M0, ER+ IL+ HER2-) carcinoma and DCIS of the left breast status post neoadjuvant anastrozole, breast conservation surgery, and postoperative radiotherapy.    Clinically and radiographically no evidence of disease    Recommendations:  Mrs. Foster appears to be doing well.  The patient is being followed closely by the medical oncology service at [facility]. The surveillance mammograms are obtained at that center.  The patient believes that she has a follow-up appointment at [place] in mid May.  The patient will be seen in follow-up by our service in 6 months.  Mrs. Foster was encouraged to perform breast and regional carolyn self assessments on a regular basis.    I am spent with " patient 30 minutes (the total time included previsit review of medical records, performing an appropriate medical examination/evaluation, patient counseling, and coordination of care).    Electronically signed by Hiren Orosco MD 3/14/2024  16:33 EDT

## 2024-09-16 ENCOUNTER — OFFICE VISIT (OUTPATIENT)
Dept: RADIATION ONCOLOGY | Facility: HOSPITAL | Age: 64
End: 2024-09-16
Payer: COMMERCIAL

## 2024-09-16 VITALS
SYSTOLIC BLOOD PRESSURE: 143 MMHG | HEART RATE: 78 BPM | TEMPERATURE: 98.7 F | RESPIRATION RATE: 18 BRPM | DIASTOLIC BLOOD PRESSURE: 71 MMHG | OXYGEN SATURATION: 98 %

## 2024-09-16 DIAGNOSIS — Z17.0 MALIGNANT NEOPLASM OF UPPER-OUTER QUADRANT OF LEFT BREAST IN FEMALE, ESTROGEN RECEPTOR POSITIVE: Primary | ICD-10-CM

## 2024-09-16 DIAGNOSIS — C50.412 MALIGNANT NEOPLASM OF UPPER-OUTER QUADRANT OF LEFT BREAST IN FEMALE, ESTROGEN RECEPTOR POSITIVE: Primary | ICD-10-CM

## 2024-09-16 PROCEDURE — G0463 HOSPITAL OUTPT CLINIC VISIT: HCPCS | Performed by: RADIOLOGY

## 2025-02-07 ENCOUNTER — TRANSCRIBE ORDERS (OUTPATIENT)
Dept: ADMINISTRATIVE | Facility: HOSPITAL | Age: 65
End: 2025-02-07
Payer: COMMERCIAL

## 2025-02-07 ENCOUNTER — HOSPITAL ENCOUNTER (OUTPATIENT)
Dept: GENERAL RADIOLOGY | Facility: HOSPITAL | Age: 65
Discharge: HOME | End: 2025-02-07
Admitting: NURSE PRACTITIONER
Payer: COMMERCIAL

## 2025-02-07 DIAGNOSIS — M25.532 PAIN IN BOTH WRISTS: ICD-10-CM

## 2025-02-07 DIAGNOSIS — M25.532 PAIN IN BOTH WRISTS: Primary | ICD-10-CM

## 2025-02-07 DIAGNOSIS — M25.531 PAIN IN BOTH WRISTS: Primary | ICD-10-CM

## 2025-02-07 DIAGNOSIS — M25.531 PAIN IN BOTH WRISTS: ICD-10-CM

## 2025-02-07 PROCEDURE — 73110 X-RAY EXAM OF WRIST: CPT

## 2025-02-10 PROCEDURE — 73110 X-RAY EXAM OF WRIST: CPT | Performed by: RADIOLOGY

## 2025-04-02 ENCOUNTER — OFFICE VISIT (OUTPATIENT)
Dept: RADIATION ONCOLOGY | Facility: HOSPITAL | Age: 65
End: 2025-04-02
Payer: COMMERCIAL

## 2025-04-02 VITALS
RESPIRATION RATE: 16 BRPM | DIASTOLIC BLOOD PRESSURE: 85 MMHG | TEMPERATURE: 97.5 F | BODY MASS INDEX: 39.5 KG/M2 | SYSTOLIC BLOOD PRESSURE: 149 MMHG | HEART RATE: 75 BPM | WEIGHT: 223 LBS | OXYGEN SATURATION: 98 %

## 2025-04-02 DIAGNOSIS — C50.412 MALIGNANT NEOPLASM OF UPPER-OUTER QUADRANT OF LEFT BREAST IN FEMALE, ESTROGEN RECEPTOR POSITIVE: Primary | ICD-10-CM

## 2025-04-02 DIAGNOSIS — Z17.0 MALIGNANT NEOPLASM OF UPPER-OUTER QUADRANT OF LEFT BREAST IN FEMALE, ESTROGEN RECEPTOR POSITIVE: Primary | ICD-10-CM

## 2025-04-02 PROCEDURE — G0463 HOSPITAL OUTPT CLINIC VISIT: HCPCS | Performed by: RADIOLOGY

## 2025-04-02 NOTE — PROGRESS NOTES
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       History Summary:   Mrs. Terri Foster is a 65-year-old white female with ductal carcinoma of the left breast diagnosed in mid December 2022.  Biopsy showed the malignancy to be estrogen and progesterone receptor positive, and HER2 negative.  The patient was enrolled in the CHRISTAL-54 trial to assess the efficacy of neoadjuvant endocrine therapy and ER positive, HER2 negative early breast cancers.  The patient received anastrozole.    The patient underwent breast conservation surgery with sentinel lymph node biopsies in mid January 2023.  The patient was noted to have a 2.5 cm ductal carcinoma and low-grade DCIS.  Micrometastases (1.5 mm) was noted in one of 2 sentinel lymph nodes.    Mrs. Foster completed a course of left breast radiotherapy on 05/31/2023.  The patient received 4600 cGy to the breast followed by a 1600 cGy tumor bed boost.  Breast radiotherapy was well-tolerated.    Interim History:  Mrs. Foster is being followed by Frances Carpenter MD her medical oncologist at [facility], and by MANNIE Tobin in [place].  The patient continues with Arimidex.  The patient recently underwent a dental evaluation prior to starting zoledronic acid therapy.  Mrs. Foster appears to be doing well.  She has not detected breast masses or regional adenopathy.  The patient denies chest wall discomfort, and arm swelling.  The patient is pleased with the excellent cosmetic result that has been achieved.    Imaging:  Bilateral diagnostic mammograms (01/17/2025) showed the breasts to be  heterogeneously dense.  Stable post lumpectomy changes were noted in the upper outer left breast.  There were no suspicious masses, calcifications, or areas of architectural distortion in either breast.    DEXA bone density scan (02/14/2025) revealed normal bone density in this postmenopausal female.    Bilateral wrist radiographs (02/07/2025) revealed bilateral first carpal metacarpal joint degenerative changes    Review of Systems:  A comprehensive 14 point review of systems was performed.  All systems were negative.    Past Medical History:   Diagnosis Date    Diabetes mellitus     Infectious viral hepatitis     Migraines         Past Surgical History:   Procedure Laterality Date    BRE[facility]23    [facility]    GALLBLADDER SURGERY      TONSILLECTOMY        Family History   Problem Relation Age of Onset    Diabetes Mother     Hyperlipidemia Mother     Diabetes Father     Heart disease Father     Hyperlipidemia Father     Cancer Brother         skin    Diabetes Brother     Hyperlipidemia Brother     Breast cancer Neg Hx         Social History     Socioeconomic History    Marital status: Legally    Tobacco Use    Smoking status: Never    Smokeless tobacco: Never   Vaping Use    Vaping status: Never Used   Substance and Sexual Activity    Alcohol use: Never    Drug use: Never    Sexual activity: Defer       Allergies:  Patient has no known allergies.   Prior to Admission medications    Medication Sig Start Date End Date Taking? Authorizing Provider   anastrozole (ARIMIDEX) 1 MG tablet Take  by mouth Daily.   Yes Cathy House MD   Biotin 1000 MCG tablet Take 1,000 mcg by mouth Daily.   Yes Cathy House MD   Black Cohosh 160 MG capsule Take  by mouth.   Yes Cathy House MD   glimepiride (AMARYL) 2 MG tablet  12/18/23  Yes Cathy House MD   metFORMIN (GLUCOPHAGE) 500 MG tablet 1 tablet 2 (Two) Times a Day With Meals. 5/19/22  Yes Cathy House MD    multivitamin (THERAGRAN) tablet tablet Take  by mouth Daily.   Yes Cathy House MD   Omega-3 Fatty Acids (fish oil) 1000 MG capsule capsule Take  by mouth Daily With Breakfast.   Yes Cathy House MD   pantoprazole (PROTONIX) 40 MG EC tablet TAKE 1 TABLET BY MOUTH TWICE DAILY FOR STOMACH 22  Yes Cathy House MD   propranolol (INDERAL) 40 MG tablet  22  Yes Cathy House MD   rosuvastatin (CRESTOR) 10 MG tablet  22  Yes Cathy House MD   vitamin D (ERGOCALCIFEROL) 1.25 MG (31550 UT) capsule capsule  3/13/24  Yes Cathy House MD   Vitamin D, Cholecalciferol, (CHOLECALCIFEROL) 10 MCG (400 UNIT) tablet Take 1 tablet by mouth Daily.   Yes Cathy House MD   vitamin E 400 UNIT capsule Take  by mouth Daily.   Yes Cathy House MD   amoxicillin (AMOXIL) 875 MG tablet  23   Cathy House MD   fluticasone (FLONASE) 50 MCG/ACT nasal spray SHAKE LIQUID AND USE 1 SPRAY IN EACH NOSTRIL EVERY 12 HOURS FOR NASAL CONGESTION  Patient not taking: Reported on 22   Cathy House MD   multivitamin with minerals (OCUVITE EXTRA PO) Take 1 tablet by mouth Daily.    Cathy House MD   naloxone (NARCAN) 4 MG/0.1ML nasal spray Use 1 spray into the nostril(s) as directed by provider As Needed (opioid overdose). Call 911. Don't prime. Repeat in 2-3 min if necessary.  Patient not taking: Reported on 2024   Shanelle Marie MD   ondansetron (ZOFRAN) 8 MG tablet  22   Cathy House MD   silver sulfadiazine (SILVADENE, SSD) 1 % cream Apply 1 application topically to the appropriate area as directed 2 (Two) Times a Day.  Patient not taking: Reported on 2025   Jenifer Damon MD      Pain:(on a scale of 0-10)    Pain Score    25 1545   PainSc: 0-No pain      Terri Foster reports a pain score of 0.       Quality of Life:   KPS: 100  ECO    Physical Examination:  Vitals:  149/85,  "pulse 75, respirations 16, temperature 97.5, pulse oximetry 98%  Height:    Weight: Weight: 101 kg (223 lb) Body mass index is 39.5 kg/m².    Physical Exam  Constitutional: The patient is a well-developed, well-nourished moderately obese white female in no acute distress.  Alert and oriented ×3.  HEENT: Atraumatic. Normocephalic. No abnormalities noted.  Lymphatics: No cervical, supraclavicular, or axillary  is palpated.  CV: Regular rate and rhythm.  No murmurs, rubs, or gallops are appreciated.  Respiratory: Lungs clear to auscultation.  Breath sounds equal bilaterally.  Breasts: Surgical scars in the left breast  are well healed, and there are no suspicious lesions or nodules palpated. The right breast is within normal limits, with no suspicious lesions or nodules palpated.  GI: Not examined  Integumentary: No suspicious lesions noted on sun exposed skin  Extremities: No clubbing, cyanosis, or edema.  Neurologic: Cranial nerves II through XII are grossly intact, with no focal neurological deficits noted on exam.  Psychiatric: Alert and oriented x3. Normal affect, with no anxiety or depression noted.    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    Recommendations:  Mrs. Foster appears to be doing well.  She has no findings worrisome for recurrent or residual malignancy.  The patient has no side effects to report from breast radiotherapy which was completed approximately 22 months ago.  Mrs. Foster will see Dr. Carpenter in follow-up on 07/18/2025.  The patient sees Christine CHAND, her PCP in [place], quarterly.    Mrs. Foster will be discharged from further follow-up visits with our service.  We would be pleased to see this pleasant lady again if clinically indicated.    Time spent with patient 20 minutes (the total time included previsit review of medical records and imaging studies, obtaining an updated history of present illness from the patient, performing an appropriate medical examination/evaluation, and patient counseling).    Thank you again for allowing our participation in Mrs. Foster' treatment    Electronically signed by Hiren Orosco MD 4/2/2025  16:18 EDT    cc:   MANNIE Tobin MD